# Patient Record
Sex: MALE | Race: WHITE | NOT HISPANIC OR LATINO | Employment: FULL TIME | URBAN - METROPOLITAN AREA
[De-identification: names, ages, dates, MRNs, and addresses within clinical notes are randomized per-mention and may not be internally consistent; named-entity substitution may affect disease eponyms.]

---

## 2017-01-17 ENCOUNTER — HOSPITAL ENCOUNTER (OUTPATIENT)
Dept: RADIOLOGY | Facility: MEDICAL CENTER | Age: 37
End: 2017-01-17
Attending: NURSE PRACTITIONER
Payer: COMMERCIAL

## 2017-01-17 ENCOUNTER — OFFICE VISIT (OUTPATIENT)
Dept: MEDICAL GROUP | Facility: MEDICAL CENTER | Age: 37
End: 2017-01-17
Payer: COMMERCIAL

## 2017-01-17 ENCOUNTER — TELEPHONE (OUTPATIENT)
Dept: MEDICAL GROUP | Facility: MEDICAL CENTER | Age: 37
End: 2017-01-17

## 2017-01-17 VITALS
HEART RATE: 66 BPM | BODY MASS INDEX: 33.32 KG/M2 | TEMPERATURE: 79.5 F | HEIGHT: 65 IN | SYSTOLIC BLOOD PRESSURE: 130 MMHG | WEIGHT: 200 LBS | DIASTOLIC BLOOD PRESSURE: 90 MMHG | OXYGEN SATURATION: 97 %

## 2017-01-17 DIAGNOSIS — E66.9 OBESITY (BMI 30-39.9): ICD-10-CM

## 2017-01-17 DIAGNOSIS — M54.6 ACUTE MIDLINE THORACIC BACK PAIN: ICD-10-CM

## 2017-01-17 PROCEDURE — 72070 X-RAY EXAM THORAC SPINE 2VWS: CPT

## 2017-01-17 PROCEDURE — 81002 URINALYSIS NONAUTO W/O SCOPE: CPT | Performed by: NURSE PRACTITIONER

## 2017-01-17 PROCEDURE — 99213 OFFICE O/P EST LOW 20 MIN: CPT | Performed by: NURSE PRACTITIONER

## 2017-01-17 RX ORDER — TIZANIDINE 4 MG/1
4 TABLET ORAL EVERY 6 HOURS PRN
Qty: 30 TAB | Refills: 3 | Status: SHIPPED | OUTPATIENT
Start: 2017-01-17 | End: 2019-01-30

## 2017-01-17 RX ORDER — TRAMADOL HYDROCHLORIDE 50 MG/1
50 TABLET ORAL EVERY 4 HOURS PRN
Qty: 20 TAB | Refills: 0 | Status: SHIPPED | OUTPATIENT
Start: 2017-01-17 | End: 2017-07-14

## 2017-01-17 RX ORDER — DICLOFENAC SODIUM 75 MG/1
75 TABLET, DELAYED RELEASE ORAL 2 TIMES DAILY
Qty: 30 TAB | Refills: 0 | Status: SHIPPED | OUTPATIENT
Start: 2017-01-17 | End: 2017-07-14

## 2017-01-17 NOTE — MR AVS SNAPSHOT
"        Timothy William Collette   2017 4:45 PM   Office Visit   MRN: [id number]    Department:  [facility]   Dept Phone:  [phone]    Description:  Male : 1980   Provider:  CHAI Solis           Allergies as of 2017     No Known Allergies      You were diagnosed with     Acute midline thoracic back pain   [[id number]]   UA in office neg for blood.  xray thoracic spine.  voltaren bid x 10 days.  tizanidine hs x 10 days.  no lifting or bending for next 2 weeks.      Obesity (BMI 30-39.9)   [362262]         Vital Signs     Blood Pressure Pulse Temperature Height Weight Body Mass Index    130/90 mmHg 66 26.4 °C (79.5 °F) 1.651 m (5' 5\") 90.719 kg (200 lb) 33.28 kg/m2    Oxygen Saturation Smoking Status                97% Former Smoker          Basic Information     Date Of Birth Sex Race Ethnicity Preferred Language    1980 Male White Non- English      Problem List              ICD-10-CM Priority Class Noted - Resolved    Chronic gout without tophus M1A.9XX0   Unknown - Present    Kidney stone N20.0   Unknown - Present    Obesity (BMI 30-39.9) E66.9   2017 - Present      Health Maintenance        Date Due Completion Dates    IMM DTaP/Tdap/Td Vaccine (1 - Tdap) 1997 ---    IMM INFLUENZA (1) 2016 ---            Current Immunizations     No immunizations on file.      Below and/or attached are the medications your provider expects you to take. Review all of your home medications and newly ordered medications with your provider and/or pharmacist. Follow medication instructions as directed by your provider and/or pharmacist. Please keep your medication list with you and share with your provider. Update the information when medications are discontinued, doses are changed, or new medications (including over-the-counter products) are added; and carry medication information at all times in the event of emergency situations     Allergies:  No Known Allergies          "   Medications  Valid as of: January 18, 2017 -  4:56 PM    Generic Name Brand Name Tablet Size Instructions for use    Diclofenac Sodium (Tablet Delayed Response) VOLTAREN 75 MG Take 1 Tab by mouth 2 times a day.        Indomethacin (Cap) INDOCIN 50 MG Take 1 Cap by mouth 3 times a day.        TiZANidine HCl (Tab) ZANAFLEX 4 MG Take 1 Tab by mouth every 6 hours as needed.        TraMADol HCl (Tab) ULTRAM 50 MG Take 1 Tab by mouth every four hours as needed.        .                 Medicines prescribed today were sent to:     [facility] [place] - [address]    [address] [place] [address]    Phone: [phone] Fax: [phone]    Open 24 Hours?: No      Medication refill instructions:       If your prescription bottle indicates you have medication refills left, it is not necessary to call your provider’s office. Please contact your pharmacy and they will refill your medication.    If your prescription bottle indicates you do not have any refills left, you may request refills at any time through one of the following ways: The online [employer] system (except Urgent Care), by calling your provider’s office, or by asking your pharmacy to contact your provider’s office with a refill request. Medication refills are processed only during regular business hours and may not be available until the next business day. Your provider may request additional information or to have a follow-up visit with you prior to refilling your medication.   *Please Note: Medication refills are assigned a new Rx number when refilled electronically. Your pharmacy may indicate that no refills were authorized even though a new prescription for the same medication is available at the pharmacy. Please request the medicine by name with the pharmacy before contacting your provider for a refill.        Your To Do List     Future Labs/Procedures Complete By Expires    DX-THORACIC SPINE-2 VIEWS  As directed 1/17/2018          MyChart Status: Patient Declined

## 2017-01-18 NOTE — PROGRESS NOTES
"Subjective:      Timothy William Collette is a 36 y.o. male who presents with No chief complaint on file.            HPI  Seen in f/u for thoracic back pain.  Started last nite.  No hxof injury.  Does have hx of kidney stone but pain is in mid back.  No blood in UA today in office.  Just pain in single spot.  Pain with deep breathing and reaching with arms.  He has been drinking adequately.  No chest pain or SOB.  No numbness or tingling.  Never had issue like this before.    Patient Active Problem List    Diagnosis Date Noted   • Obesity (BMI 30-39.9) 01/17/2017   • Kidney stone    • Chronic gout without tophus      Current Outpatient Prescriptions   Medication Sig Dispense Refill   • indomethacin (INDOCIN) 50 MG Cap Take 1 Cap by mouth 3 times a day. 30 Cap 0     No current facility-administered medications for this visit.     No Known Allergies    ROS    Review of Systems   Constitutional: Negative.  Negative for fever, chills, weight loss, malaise/fatigue and diaphoresis.   HENT: Negative.  Negative for hearing loss, ear pain, nosebleeds, congestion, sore throat, neck pain, tinnitus and ear discharge.    Respiratory: Negative.  Negative for cough, hemoptysis, sputum production, shortness of breath, wheezing and stridor.    Cardiovascular: Negative.  Negative for chest pain, palpitations, orthopnea, claudication, leg swelling and PND.   Gastrointestinal: denies nausea, vomiting, diarrhea, constipation, heartburn, melena or hematochezia.  Genitourinary: Denies dysuria, hematuria, urinary incontinence, frequency or urgency.         Objective:     /90 mmHg  Pulse 66  Temp(Src) 26.4 °C (79.5 °F)  Ht 1.651 m (5' 5\")  Wt 90.719 kg (200 lb)  BMI 33.28 kg/m2  SpO2 97%     Physical Exam  Physical Exam   Vitals reviewed.  Constitutional: oriented to person, place, and time. appears well-developed and well-nourished. No distress.   Cardiovascular: Normal rate, regular rhythm, normal heart sounds and intact " distal pulses.  Exam reveals no gallop and no friction rub.  No murmur heard.  No carotid bruits.   Pulmonary/Chest: Effort normal and breath sounds normal. No stridor. No respiratory distress. no wheezes or rales. exhibits no tenderness.   Musculoskeletal: Normal range of motion. exhibits no edema. kanwal pedal pulses 2+.  Pain to palp in mid thoracic spine area.  Abd:  No CVAT,  Soft,  Bs noted in all quadrants.  No HSM.  No abdominal tenderness.  Neurological: alert and oriented to person, place, and time. exhibits normal muscle tone. Coordination normal.   Skin: Skin is warm and dry. no diaphoresis.   Psychiatric: normal mood and affect. behavior is normal.                Assessment/Plan:     1. Acute midline thoracic back pain  diclofenac EC (VOLTAREN) 75 MG Tablet Delayed Response    tizanidine (ZANAFLEX) 4 MG Tab    tramadol (ULTRAM) 50 MG Tab    DX-THORACIC SPINE-2 VIEWS    UA in office neg for blood.  xray thoracic spine.  voltaren bid x 10 days.  tizanidine hs x 10 days.  no lifting or bending for next 2 weeks.     2. Obesity (BMI 30-39.9)  diclofenac EC (VOLTAREN) 75 MG Tablet Delayed Response    tizanidine (ZANAFLEX) 4 MG Tab    tramadol (ULTRAM) 50 MG Tab    DX-THORACIC SPINE-2 VIEWS     3.  Ultram 50 mg bid prn x 10 days back pain  4.  ER precautions for any worsening sx.  5.  Fu if sx not imrpoved with tx.

## 2017-01-18 NOTE — TELEPHONE ENCOUNTER
Please let pt know that the thoracic xray is wnl.  If sx not imrpoved with tx in next 10 days will proceed with MRI

## 2017-01-19 LAB
APPEARANCE UR: CLEAR
BILIRUB UR STRIP-MCNC: NORMAL MG/DL
COLOR UR AUTO: YELLOW
GLUCOSE UR STRIP.AUTO-MCNC: NORMAL MG/DL
KETONES UR STRIP.AUTO-MCNC: NORMAL MG/DL
LEUKOCYTE ESTERASE UR QL STRIP.AUTO: NORMAL
NITRITE UR QL STRIP.AUTO: NORMAL
PH UR STRIP.AUTO: 6 [PH] (ref 5–8)
PROT UR QL STRIP: NORMAL MG/DL
RBC UR QL AUTO: NORMAL
SP GR UR STRIP.AUTO: 1.01
UROBILINOGEN UR STRIP-MCNC: NORMAL MG/DL

## 2017-07-14 ENCOUNTER — OFFICE VISIT (OUTPATIENT)
Dept: MEDICAL GROUP | Facility: MEDICAL CENTER | Age: 37
End: 2017-07-14
Payer: COMMERCIAL

## 2017-07-14 VITALS
BODY MASS INDEX: 30.99 KG/M2 | OXYGEN SATURATION: 98 % | HEART RATE: 80 BPM | HEIGHT: 65 IN | SYSTOLIC BLOOD PRESSURE: 118 MMHG | TEMPERATURE: 98.4 F | WEIGHT: 186 LBS | DIASTOLIC BLOOD PRESSURE: 80 MMHG

## 2017-07-14 DIAGNOSIS — S16.1XXA ACUTE STRAIN OF NECK MUSCLE, INITIAL ENCOUNTER: ICD-10-CM

## 2017-07-14 DIAGNOSIS — E66.9 OBESITY (BMI 35.0-39.9 WITHOUT COMORBIDITY): ICD-10-CM

## 2017-07-14 PROCEDURE — 99214 OFFICE O/P EST MOD 30 MIN: CPT | Performed by: FAMILY MEDICINE

## 2017-07-14 RX ORDER — MELOXICAM 7.5 MG/1
7.5 TABLET ORAL DAILY
Qty: 30 TAB | Refills: 0 | Status: SHIPPED | OUTPATIENT
Start: 2017-07-14 | End: 2019-01-30

## 2017-07-14 NOTE — ASSESSMENT & PLAN NOTE
Three weeks ago was jet skiing and jumped a wave and lalo it.  No fall or other trauma.  He is taking 1/2 Zanaflex at night and that has helped. He has not been taking any anti-inflammatories or icing it. He has been doing some gentle stretches. He denies any previous neck pain. He denies any numbness or tingling. No loss of strength. No loss of bowel or bladder control.

## 2017-07-14 NOTE — MR AVS SNAPSHOT
"        Timothy William Collette   2017 11:20 AM   Office Visit   MRN: [id number]    Department:  [facility]   Dept Phone:  [phone]    Description:  Male : 1980   Provider:  Zohreh Travis M.D.           Reason for Visit     Neck Pain for 2 weeks       Allergies as of 2017     No Known Allergies      You were diagnosed with     Acute strain of neck muscle, initial encounter   [[id number]]         Vital Signs     Blood Pressure Pulse Temperature Height Weight Body Mass Index    118/80 mmHg 80 36.9 °C (98.4 °F) 1.651 m (5' 5\") 84.369 kg (186 lb) 30.95 kg/m2    Oxygen Saturation Smoking Status                98% Former Smoker          Basic Information     Date Of Birth Sex Race Ethnicity Preferred Language    1980 Male White Non- English      Problem List              ICD-10-CM Priority Class Noted - Resolved    Chronic gout without tophus M1A.9XX0   Unknown - Present    Kidney stone N20.0   Unknown - Present    Obesity (BMI 30-39.9) E66.9   2017 - Present    Acute strain of neck muscle S16.1XXA   2017 - Present      Health Maintenance        Date Due Completion Dates    IMM DTaP/Tdap/Td Vaccine (1 - Tdap) 1997 ---    IMM INFLUENZA (1) 2017 ---            Current Immunizations     No immunizations on file.      Below and/or attached are the medications your provider expects you to take. Review all of your home medications and newly ordered medications with your provider and/or pharmacist. Follow medication instructions as directed by your provider and/or pharmacist. Please keep your medication list with you and share with your provider. Update the information when medications are discontinued, doses are changed, or new medications (including over-the-counter products) are added; and carry medication information at all times in the event of emergency situations     Allergies:  No Known Allergies          Medications  Valid as of: 2017 - 11:35 AM   " Generic Name Brand Name Tablet Size Instructions for use    Meloxicam (Tab) MOBIC 7.5 MG Take 1 Tab by mouth every day.        TiZANidine HCl (Tab) ZANAFLEX 4 MG Take 1 Tab by mouth every 6 hours as needed.        .                 Medicines prescribed today were sent to:     [facility] [address] [facility], [place] [address]    [address] [place] [address]    Phone: [phone] Fax: [phone]    Open 24 Hours?: No      Medication refill instructions:       If your prescription bottle indicates you have medication refills left, it is not necessary to call your provider’s office. Please contact your pharmacy and they will refill your medication.    If your prescription bottle indicates you do not have any refills left, you may request refills at any time through one of the following ways: The online [employer] system (except Urgent Care), by calling your provider’s office, or by asking your pharmacy to contact your provider’s office with a refill request. Medication refills are processed only during regular business hours and may not be available until the next business day. Your provider may request additional information or to have a follow-up visit with you prior to refilling your medication.   *Please Note: Medication refills are assigned a new Rx number when refilled electronically. Your pharmacy may indicate that no refills were authorized even though a new prescription for the same medication is available at the pharmacy. Please request the medicine by name with the pharmacy before contacting your provider for a refill.        Instructions    Cervical Sprain  A cervical sprain is when the tissues (ligaments) that hold the neck bones in place stretch or tear.  HOME CARE   · Put ice on the injured area.  ¨ Put ice in a plastic bag.  ¨ Place a towel between your skin and the bag.  ¨ Leave the ice on for 15-20 minutes, 3-4 times a day.  · You may have been given a collar to wear. This collar keeps  your neck from moving while you heal.  ¨ Do not take the collar off unless told by your doctor.  ¨ If you have long hair, keep it outside of the collar.  ¨ Ask your doctor before changing the position of your collar. You may need to change its position over time to make it more comfortable.  ¨ If you are allowed to take off the collar for cleaning or bathing, follow your doctor's instructions on how to do it safely.  ¨ Keep your collar clean by wiping it with mild soap and water. Dry it completely. If the collar has removable pads, remove them every 1-2 days to hand wash them with soap and water. Allow them to air dry. They should be dry before you wear them in the collar.  ¨ Do not drive while wearing the collar.  · Only take medicine as told by your doctor.  · Keep all doctor visits as told.  · Keep all physical therapy visits as told.  · Adjust your work station so that you have good posture while you work.  · Avoid positions and activities that make your problems worse.  · Warm up and stretch before being active.  GET HELP IF:  · Your pain is not controlled with medicine.  · You cannot take less pain medicine over time as planned.  · Your activity level does not improve as expected.  GET HELP RIGHT AWAY IF:   · You are bleeding.  · Your stomach is upset.  · You have an allergic reaction to your medicine.  · You develop new problems that you cannot explain.  · You lose feeling (become numb) or you cannot move any part of your body (paralysis).  · You have tingling or weakness in any part of your body.  · Your symptoms get worse. Symptoms include:  ¨ Pain, soreness, stiffness, puffiness (swelling), or a burning feeling in your neck.  ¨ Pain when your neck is touched.  ¨ Shoulder or upper back pain.  ¨ Limited ability to move your neck.  ¨ Headache.  ¨ Dizziness.  ¨ Your hands or arms feel week, lose feeling, or tingle.  ¨ Muscle spasms.  ¨ Difficulty swallowing or chewing.  MAKE SURE YOU:   · Understand these  instructions.  · Will watch your condition.  · Will get help right away if you are not doing well or get worse.     This information is not intended to replace advice given to you by your health care provider. Make sure you discuss any questions you have with your health care provider.     Document Released: 06/05/2009 Document Revised: 08/20/2014 Document Reviewed: 06/25/2014  [employer] Interactive Patient Education ©2016 [employer]              MyChart Status: Patient Declined

## 2017-07-14 NOTE — PATIENT INSTRUCTIONS
Cervical Sprain  A cervical sprain is when the tissues (ligaments) that hold the neck bones in place stretch or tear.  HOME CARE   · Put ice on the injured area.  ¨ Put ice in a plastic bag.  ¨ Place a towel between your skin and the bag.  ¨ Leave the ice on for 15-20 minutes, 3-4 times a day.  · You may have been given a collar to wear. This collar keeps your neck from moving while you heal.  ¨ Do not take the collar off unless told by your doctor.  ¨ If you have long hair, keep it outside of the collar.  ¨ Ask your doctor before changing the position of your collar. You may need to change its position over time to make it more comfortable.  ¨ If you are allowed to take off the collar for cleaning or bathing, follow your doctor's instructions on how to do it safely.  ¨ Keep your collar clean by wiping it with mild soap and water. Dry it completely. If the collar has removable pads, remove them every 1-2 days to hand wash them with soap and water. Allow them to air dry. They should be dry before you wear them in the collar.  ¨ Do not drive while wearing the collar.  · Only take medicine as told by your doctor.  · Keep all doctor visits as told.  · Keep all physical therapy visits as told.  · Adjust your work station so that you have good posture while you work.  · Avoid positions and activities that make your problems worse.  · Warm up and stretch before being active.  GET HELP IF:  · Your pain is not controlled with medicine.  · You cannot take less pain medicine over time as planned.  · Your activity level does not improve as expected.  GET HELP RIGHT AWAY IF:   · You are bleeding.  · Your stomach is upset.  · You have an allergic reaction to your medicine.  · You develop new problems that you cannot explain.  · You lose feeling (become numb) or you cannot move any part of your body (paralysis).  · You have tingling or weakness in any part of your body.  · Your symptoms get worse. Symptoms include:  ¨ Pain,  soreness, stiffness, puffiness (swelling), or a burning feeling in your neck.  ¨ Pain when your neck is touched.  ¨ Shoulder or upper back pain.  ¨ Limited ability to move your neck.  ¨ Headache.  ¨ Dizziness.  ¨ Your hands or arms feel week, lose feeling, or tingle.  ¨ Muscle spasms.  ¨ Difficulty swallowing or chewing.  MAKE SURE YOU:   · Understand these instructions.  · Will watch your condition.  · Will get help right away if you are not doing well or get worse.     This information is not intended to replace advice given to you by your health care provider. Make sure you discuss any questions you have with your health care provider.     Document Released: 06/05/2009 Document Revised: 08/20/2014 Document Reviewed: 06/25/2014  [employer] Interactive Patient Education ©2016 [employer]

## 2017-07-14 NOTE — PROGRESS NOTES
"Subjective:     Chief Complaint   Patient presents with   • Neck Pain     for 2 weeks        Timothy William Collette is a 37 y.o. male here today for evaluation and management of:    Acute strain of neck muscle  Three weeks ago was jet skiing and jumped a wave and lalo it.  No fall or other trauma.  He is taking 1/2 Zanaflex at night and that has helped. He has not been taking any anti-inflammatories or icing it. He has been doing some gentle stretches. He denies any previous neck pain. He denies any numbness or tingling. No loss of strength. No loss of bowel or bladder control.       No Known Allergies    Current medicines (including changes today)  Current Outpatient Prescriptions   Medication Sig Dispense Refill   • meloxicam (MOBIC) 7.5 MG Tab Take 1 Tab by mouth every day. 30 Tab 0   • tizanidine (ZANAFLEX) 4 MG Tab Take 1 Tab by mouth every 6 hours as needed. 30 Tab 3     No current facility-administered medications for this visit.       He  has a past medical history of Chronic gout without tophus and Kidney stone.    Patient Active Problem List    Diagnosis Date Noted   • Acute strain of neck muscle 07/14/2017   • Obesity (BMI 35.0-39.9 without comorbidity) ([facility]) 07/14/2017   • Obesity (BMI 30-39.9) 01/17/2017   • Kidney stone    • Chronic gout without tophus        ROS   No fever or chills.  No nausea or vomiting.  No chest pain or palpitations.  No cough or SOB.  No pain with urination or hematuria.  No black or bloody stools.       Objective:     Blood pressure 118/80, pulse 80, temperature 36.9 °C (98.4 °F), height 1.651 m (5' 5\"), weight 84.369 kg (186 lb), SpO2 98 %. Body mass index is 30.95 kg/(m^2).   Physical Exam:  Well developed, well nourished.  Alert, oriented in no acute distress.  Eye contact is good, speech goal directed, affect calm  Eyes: conjunctiva non-injected, sclera non-icteric.  Neck Supple.  No adenopathy or masses in the neck or supraclavicular regions. No thyromegaly   No " visible deformity. No spinal tenderness to palpation but there is paraspinal muscle spasm and tenderness on the right into the trapezius. ROM intact. Spurling's test negative.   Lungs: clear to auscultation bilaterally with good excursion. No wheezes or rhonchi  CV: regular rate and rhythm. No murmur  Neurologic: 5/5 motor strength in the upper extremities with normal sensation      Assessment and Plan:   The following treatment plan was discussed    1. Acute strain of neck muscle, initial encounter  Discussed with patient that whiplash injuries take about 6 weeks to resolve. Given educational materials on stretching and rehabilitation. Start meloxicam as directed. Ice to the area. If not improving refer to physical therapy but patient would like to try home program first. He will follow up with Ashleigh June if not improving.  - meloxicam (MOBIC) 7.5 MG Tab; Take 1 Tab by mouth every day.  Dispense: 30 Tab; Refill: 0    2. Obesity (BMI 35.0-39.9 without comorbidity) ([facility])  Encouraged weight loss  - Patient identified as having weight management issue.  Appropriate orders and counseling given.    Any change or worsening of signs or symptoms, patient encouraged to follow-up or report to the emergency room for further evaluation. Patient understands and agrees.    Followup: Return if symptoms worsen or fail to improve.

## 2017-08-31 ENCOUNTER — APPOINTMENT (OUTPATIENT)
Dept: RADIOLOGY | Facility: IMAGING CENTER | Age: 37
End: 2017-08-31
Attending: NURSE PRACTITIONER
Payer: COMMERCIAL

## 2017-08-31 ENCOUNTER — OFFICE VISIT (OUTPATIENT)
Dept: URGENT CARE | Facility: CLINIC | Age: 37
End: 2017-08-31
Payer: COMMERCIAL

## 2017-08-31 VITALS
HEART RATE: 60 BPM | TEMPERATURE: 99.1 F | HEIGHT: 65 IN | DIASTOLIC BLOOD PRESSURE: 90 MMHG | WEIGHT: 180 LBS | SYSTOLIC BLOOD PRESSURE: 132 MMHG | BODY MASS INDEX: 29.99 KG/M2 | OXYGEN SATURATION: 98 % | RESPIRATION RATE: 16 BRPM

## 2017-08-31 DIAGNOSIS — S89.91XA KNEE INJURY, RIGHT, INITIAL ENCOUNTER: ICD-10-CM

## 2017-08-31 DIAGNOSIS — Z87.39 HISTORY OF GOUT: ICD-10-CM

## 2017-08-31 PROCEDURE — 99213 OFFICE O/P EST LOW 20 MIN: CPT | Performed by: NURSE PRACTITIONER

## 2017-08-31 PROCEDURE — 73562 X-RAY EXAM OF KNEE 3: CPT | Mod: TC,RT | Performed by: NURSE PRACTITIONER

## 2017-08-31 RX ORDER — HYDROCODONE BITARTRATE AND ACETAMINOPHEN 5; 325 MG/1; MG/1
1-2 TABLET ORAL EVERY 6 HOURS PRN
Qty: 20 TAB | Refills: 0 | Status: SHIPPED | OUTPATIENT
Start: 2017-08-31 | End: 2018-05-08

## 2017-08-31 RX ORDER — PREDNISONE 10 MG/1
TABLET ORAL
Qty: 15 TAB | Refills: 0 | Status: SHIPPED | OUTPATIENT
Start: 2017-08-31 | End: 2018-05-08

## 2017-08-31 NOTE — PROGRESS NOTES
"Subjective:      Timothy William Collette is a 37 y.o. male who presents with Knee Pain (right knee injured on water sport, since last Sunday, pain has increased, also have hx of gout, unsure if injury or flare up)    Past Medical History:   Diagnosis Date   • Chronic gout without tophus    • Kidney stone      Social History     Social History   • Marital status:      Spouse name: N/A   • Number of children: N/A   • Years of education: N/A     Occupational History   • Not on file.     Social History Main Topics   • Smoking status: Former Smoker     Packs/day: 0.50     Years: 1.00     Types: Cigarettes   • Smokeless tobacco: Never Used   • Alcohol use 0.0 oz/week      Comment: occ   • Drug use: No   • Sexual activity: Yes     Partners: Female     Other Topics Concern   • Not on file     Social History Narrative   • No narrative on file     Family History   Problem Relation Age of Onset   • Diabetes Mother    • Cancer Maternal Grandmother      melanoma       Allergies: Review of patient's allergies indicates no known allergies.    This patient is a 37-year-old male who presents today with complaint of pain and swelling to the right knee. Symptoms started 5 days ago when he was riding a jet ski and his knee against the side of the jet ski. Pain has exacerbated over the last 48 hours and patient is having increasingly painful range of motion with a decrease in overall range of motion. He denies any fever, aches, or chills.        Knee Pain   This is a new problem. The current episode started in the past 7 days. The problem occurs constantly. The problem has been unchanged. Nothing aggravates the symptoms. He has tried NSAIDs and rest for the symptoms. The treatment provided no relief.       Review of Systems   Musculoskeletal:        Knee pain over the last 7 days.   All other systems reviewed and are negative.         Objective:     /90   Pulse 60   Temp 37.3 °C (99.1 °F)   Resp 16   Ht 1.651 m (5' 5\") "   Wt 81.6 kg (180 lb)   SpO2 98%   BMI 29.95 kg/m²      Physical Exam   Constitutional: He is oriented to person, place, and time. He appears well-developed and well-nourished. No distress.   Musculoskeletal:        Right knee: He exhibits decreased range of motion and swelling. Tenderness found.        Legs:  There is generalized swelling over the knee. There is tenderness over the patella. Patient is able to extend fully but is able to flex only to 150°. There is slight warmth over the knee. No obvious erythema. Patient is afebrile.   Neurological: He is alert and oriented to person, place, and time.   Skin: Skin is warm and dry. Capillary refill takes less than 2 seconds. He is not diaphoretic.   Psychiatric: He has a normal mood and affect. His behavior is normal.   Vitals reviewed.    XR knee:       8/31/2017 9:33 AM    HISTORY/REASON FOR EXAM:  Pain/Deformity Following Trauma  Inferior right patellar pain post striking right knee on jet ski    TECHNIQUE/EXAM DESCRIPTION AND NUMBER OF VIEWS:  3 views of the RIGHT knee.    COMPARISON: None    FINDINGS:  No acute fracture or dislocation.    No joint arthropathy.    No knee joint effusion.   Impression         1. No acute osseous abnormality.   \    Discussed with patient that though his x-ray is negative, there is a possibility that he may have a septic arthritis with this. I did recommend labs including a CBC and uric acid, patient declined at this time. We will treat symptomatically with the understanding that the patient will follow-up in the emergency room for increasing pain, decreasing range of motion, fever, increased redness or erythema over the knee joint. Patient verbalized understanding and agreement with plan of care.            Assessment/Plan:   Knee injury  History of gout   -prednisone   -norco PRN pain   -REfused labs today (CBC, uric acid)   -Strict ER precautions for increasing pain, decreasing ROM, fever, redness.   There are no diagnoses  linked to this encounter.

## 2017-09-07 ENCOUNTER — NON-PROVIDER VISIT (OUTPATIENT)
Dept: URGENT CARE | Facility: CLINIC | Age: 37
End: 2017-09-07

## 2017-09-07 DIAGNOSIS — Z02.1 PRE-EMPLOYMENT DRUG SCREENING: ICD-10-CM

## 2017-09-07 LAB
AMP AMPHETAMINE: NORMAL
COC COCAINE: NORMAL
INT CON NEG: NORMAL
INT CON POS: NORMAL
MET METHAMPHETAMINES: NORMAL
OPI OPIATES: NORMAL
PCP PHENCYCLIDINE: NORMAL
POC DRUG COMMENT 753798-OCCUPATIONAL HEALTH: NORMAL
THC: NORMAL

## 2017-09-07 PROCEDURE — 80305 DRUG TEST PRSMV DIR OPT OBS: CPT | Performed by: NURSE PRACTITIONER

## 2018-05-08 ENCOUNTER — OFFICE VISIT (OUTPATIENT)
Dept: URGENT CARE | Facility: CLINIC | Age: 38
End: 2018-05-08
Payer: COMMERCIAL

## 2018-05-08 VITALS
WEIGHT: 189 LBS | BODY MASS INDEX: 31.49 KG/M2 | OXYGEN SATURATION: 96 % | RESPIRATION RATE: 14 BRPM | HEART RATE: 62 BPM | HEIGHT: 65 IN | SYSTOLIC BLOOD PRESSURE: 132 MMHG | DIASTOLIC BLOOD PRESSURE: 76 MMHG | TEMPERATURE: 98.2 F

## 2018-05-08 DIAGNOSIS — M10.9 ACUTE GOUT OF LEFT FOOT, UNSPECIFIED CAUSE: ICD-10-CM

## 2018-05-08 PROCEDURE — 99214 OFFICE O/P EST MOD 30 MIN: CPT | Performed by: NURSE PRACTITIONER

## 2018-05-08 RX ORDER — PREDNISONE 10 MG/1
40 TABLET ORAL DAILY
Qty: 20 TAB | Refills: 0 | Status: SHIPPED | OUTPATIENT
Start: 2018-05-08 | End: 2018-05-13

## 2018-05-08 RX ORDER — HYDROCODONE BITARTRATE AND ACETAMINOPHEN 5; 325 MG/1; MG/1
1-2 TABLET ORAL EVERY 4 HOURS PRN
Qty: 15 TAB | Refills: 0 | Status: SHIPPED | OUTPATIENT
Start: 2018-05-08 | End: 2018-05-11

## 2018-05-08 RX ORDER — INDOMETHACIN 50 MG/1
50 CAPSULE ORAL 3 TIMES DAILY
Qty: 90 CAP | Refills: 0 | Status: SHIPPED | OUTPATIENT
Start: 2018-05-08 | End: 2021-05-06

## 2018-05-08 ASSESSMENT — ENCOUNTER SYMPTOMS
NAUSEA: 0
SORE THROAT: 0
EYE PAIN: 0
MYALGIAS: 0
CHILLS: 0
WEAKNESS: 0
DIAPHORESIS: 0
NUMBNESS: 0
SHORTNESS OF BREATH: 0
DIZZINESS: 0
FEVER: 0
JOINT SWELLING: 1
VOMITING: 0

## 2018-05-08 ASSESSMENT — PATIENT HEALTH QUESTIONNAIRE - PHQ9: CLINICAL INTERPRETATION OF PHQ2 SCORE: 0

## 2018-05-08 NOTE — PATIENT INSTRUCTIONS
Gout  Introduction  Gout is painful swelling that can happen in some of your joints. Gout is a type of arthritis. This condition is caused by having too much uric acid in your body. Uric acid is a chemical that is made when your body breaks down substances called purines. If your body has too much uric acid, sharp crystals can form and build up in your joints. This causes pain and swelling.  Gout attacks can happen quickly and be very painful (acute gout). Over time, the attacks can affect more joints and happen more often (chronic gout).  Follow these instructions at home:  During a Gout Attack  · If directed, put ice on the painful area:  ¨ Put ice in a plastic bag.  ¨ Place a towel between your skin and the bag.  ¨ Leave the ice on for 20 minutes, 2-3 times a day.  · Rest the joint as much as possible. If the joint is in your leg, you may be given crutches to use.  · Raise (elevate) the painful joint above the level of your heart as often as you can.  · Drink enough fluids to keep your pee (urine) clear or pale yellow.  · Take over-the-counter and prescription medicines only as told by your doctor.  · Do not drive or use heavy machinery while taking prescription pain medicine.  · Follow instructions from your doctor about what you can or cannot eat and drink.  · Return to your normal activities as told by your doctor. Ask your doctor what activities are safe for you.  Avoiding Future Gout Attacks  · Follow a low-purine diet as told by a specialist (dietitian) or your doctor. Avoid foods and drinks that have a lot of purines, such as:  ¨ Liver.  ¨ Kidney.  ¨ Anchovies.  ¨ Asparagus.  ¨ Herring.  ¨ Mushrooms  ¨ Mussels.  ¨ Beer.  · Limit alcohol intake to no more than 1 drink a day for nonpregnant women and 2 drinks a day for men. One drink equals 12 oz of beer, 5 oz of wine, or 1½ oz of hard liquor.  · Stay at a healthy weight or lose weight if you are overweight. If you want to lose weight, talk with your doctor.  It is important that you do not lose weight too fast.  · Start or continue an exercise plan as told by your doctor.  · Drink enough fluids to keep your pee clear or pale yellow.  · Take over-the-counter and prescription medicines only as told by your doctor.  · Keep all follow-up visits as told by your doctor. This is important.  Contact a doctor if:  · You have another gout attack.  · You still have symptoms of a gout attack after10 days of treatment.  · You have problems (side effects) because of your medicines.  · You have chills or a fever.  · You have burning pain when you pee (urinate).  · You have pain in your lower back or belly.  Get help right away if:  · You have very bad pain.  · Your pain cannot be controlled.  · You cannot pee.  This information is not intended to replace advice given to you by your health care provider. Make sure you discuss any questions you have with your health care provider.  Document Released: 09/26/2009 Document Revised: 05/25/2017 Document Reviewed: 09/29/2016  © 2017 [employer]

## 2018-05-08 NOTE — PROGRESS NOTES
Subjective:     Timothy William Collette is a 38 y.o. male who presents for Gout (left foot)  Patient presents to clinic today with complaints of left foot pain. Patient has a history of gout and has been dealing with gout flare for the past week. Patient knows that sore a abated product triggers his gout and his sister made some cookies that had soy oil in them. He has been taking advil with minimal relief in symptoms.     Foot Problem   This is a new problem. The current episode started in the past 7 days. The problem occurs constantly. The problem has been unchanged. Associated symptoms include joint swelling. Pertinent negatives include no chest pain, chills, congestion, diaphoresis, fever, myalgias, nausea, numbness, rash, sore throat, vomiting or weakness. Associated symptoms comments: Left foot pain  . The symptoms are aggravated by walking. He has tried NSAIDs for the symptoms. The treatment provided no relief.     Past Medical History:   Diagnosis Date   • Chronic gout without tophus    • Kidney stone      Past Surgical History:   Procedure Laterality Date   • MYRINGOTOMY     • WRIST ARTHROSCOPY      tendon repair     Social History     Social History   • Marital status:      Spouse name: N/A   • Number of children: N/A   • Years of education: N/A     Occupational History   • Not on file.     Social History Main Topics   • Smoking status: Former Smoker     Packs/day: 0.50     Years: 1.00     Types: Cigarettes   • Smokeless tobacco: Never Used   • Alcohol use 0.0 oz/week      Comment: occ   • Drug use: No   • Sexual activity: Yes     Partners: Female     Other Topics Concern   • Not on file     Social History Narrative   • No narrative on file      Family History   Problem Relation Age of Onset   • Diabetes Mother    • Cancer Maternal Grandmother      melanoma    Review of Systems   Constitutional: Negative for chills, diaphoresis and fever.   HENT: Negative for congestion and sore throat.    Eyes:  "Negative for pain.   Respiratory: Negative for shortness of breath.    Cardiovascular: Negative for chest pain.   Gastrointestinal: Negative for nausea and vomiting.   Genitourinary: Negative for hematuria.   Musculoskeletal: Positive for joint pain and joint swelling. Negative for myalgias.        Left foot pain and swelling.    Skin: Negative for rash.   Neurological: Negative for dizziness, weakness and numbness.   No Known Allergies   Objective:   /76   Pulse 62   Temp 36.8 °C (98.2 °F)   Resp 14   Ht 1.651 m (5' 5\")   Wt 85.7 kg (189 lb)   SpO2 96%   BMI 31.45 kg/m²   Physical Exam   Constitutional: He is oriented to person, place, and time. He appears well-developed and well-nourished. No distress.   HENT:   Head: Normocephalic and atraumatic.   Right Ear: Tympanic membrane normal.   Left Ear: Tympanic membrane normal.   Nose: Nose normal. Right sinus exhibits no maxillary sinus tenderness and no frontal sinus tenderness. Left sinus exhibits no maxillary sinus tenderness and no frontal sinus tenderness.   Mouth/Throat: Uvula is midline, oropharynx is clear and moist and mucous membranes are normal. No posterior oropharyngeal edema, posterior oropharyngeal erythema or tonsillar abscesses. No tonsillar exudate.   Eyes: Conjunctivae and EOM are normal. Pupils are equal, round, and reactive to light. Right eye exhibits no discharge. Left eye exhibits no discharge.   Cardiovascular: Normal rate and regular rhythm.    No murmur heard.  Pulmonary/Chest: Effort normal and breath sounds normal. No respiratory distress.   Abdominal: Soft. He exhibits no distension. There is no tenderness.   Musculoskeletal:        Left foot: There is decreased range of motion, tenderness and swelling.        Feet:    TTP, erythematous and edematous    Neurological: He is alert and oriented to person, place, and time. He has normal reflexes. No sensory deficit.   Skin: Skin is warm, dry and intact.   Psychiatric: He has a " normal mood and affect.         Assessment/Plan:   Assessment    1. Acute gout of left foot, unspecified cause  - predniSONE (DELTASONE) 10 MG Tab; Take 4 Tabs by mouth every day for 5 days.  Dispense: 20 Tab; Refill: 0  - indomethacin (INDOCIN) 50 MG Cap; Take 1 Cap by mouth 3 times a day.  Dispense: 90 Cap; Refill: 0  - HYDROcodone-acetaminophen (NORCO) 5-325 MG Tab per tablet; Take 1-2 Tabs by mouth every four hours as needed for up to 3 days.  Dispense: 15 Tab; Refill: 0  - Consent for Opiate Prescription  [employer] Call query was performed to review the . The patient appears appropriate to receive medication as prescribed.        Differential diagnosis, natural history, supportive care, and indications for immediate follow-up discussed.

## 2018-11-26 ENCOUNTER — OFFICE VISIT (OUTPATIENT)
Dept: URGENT CARE | Facility: MEDICAL CENTER | Age: 38
End: 2018-11-26
Payer: COMMERCIAL

## 2018-11-26 VITALS
DIASTOLIC BLOOD PRESSURE: 74 MMHG | BODY MASS INDEX: 31.12 KG/M2 | OXYGEN SATURATION: 97 % | TEMPERATURE: 97.9 F | SYSTOLIC BLOOD PRESSURE: 128 MMHG | HEART RATE: 80 BPM | WEIGHT: 187 LBS

## 2018-11-26 DIAGNOSIS — H00.025 HORDEOLUM INTERNUM OF LEFT LOWER EYELID: ICD-10-CM

## 2018-11-26 PROCEDURE — 99214 OFFICE O/P EST MOD 30 MIN: CPT | Performed by: NURSE PRACTITIONER

## 2018-11-26 RX ORDER — CEPHALEXIN 500 MG/1
500 CAPSULE ORAL 4 TIMES DAILY
Qty: 28 CAP | Refills: 0 | Status: SHIPPED | OUTPATIENT
Start: 2018-11-26 | End: 2019-01-30 | Stop reason: SDUPTHER

## 2018-11-26 ASSESSMENT — ENCOUNTER SYMPTOMS
EYE DISCHARGE: 0
BLURRED VISION: 0
EYE ITCHING: 1
EYE REDNESS: 1

## 2018-11-26 NOTE — LETTER
November 26, 2018         Patient: Timothy William Collette   YOB: 1980   Date of Visit: 11/26/2018           To Whom it May Concern:    Timothy Collette was seen in my clinic on 11/26/2018. Please excuse him from work today.        Sincerely,           JULIUS Ace.  Electronically Signed

## 2018-11-26 NOTE — PROGRESS NOTES
Subjective:      Timothy William Collette is a 38 y.o. male who presents with Conjunctivitis (left eye, strained or possibly pink eye since yesterday,felt sore earlier in the week)            Eye Problem    The left eye is affected. This is a new problem. Episode onset: pt reports left lower eyelid redness and irritation x 1 week. he noticed a more pronounced bump to his eyelid yesterday. Denies any visual disturbance or fever. The problem occurs constantly. The problem has been gradually worsening. There was no injury mechanism. He does not wear contacts. Associated symptoms include eye redness and itching. Pertinent negatives include no blurred vision or eye discharge. Treatments tried: warm compress. The treatment provided no relief.       Review of Systems   Eyes: Positive for redness and itching. Negative for blurred vision and discharge.   All other systems reviewed and are negative.    Past Medical History:   Diagnosis Date   • Chronic gout without tophus    • Kidney stone       Past Surgical History:   Procedure Laterality Date   • MYRINGOTOMY     • WRIST ARTHROSCOPY      tendon repair      Social History     Social History   • Marital status:      Spouse name: N/A   • Number of children: N/A   • Years of education: N/A     Occupational History   • Not on file.     Social History Main Topics   • Smoking status: Former Smoker     Packs/day: 0.50     Years: 1.00     Types: Cigarettes   • Smokeless tobacco: Never Used   • Alcohol use 0.0 oz/week      Comment: occ   • Drug use: No   • Sexual activity: Yes     Partners: Female     Other Topics Concern   • Not on file     Social History Narrative   • No narrative on file          Objective:     /74 (BP Location: Left arm, Patient Position: Sitting, BP Cuff Size: Large adult)   Pulse 80   Temp 36.6 °C (97.9 °F) (Temporal)   Wt 84.8 kg (187 lb)   SpO2 97%   BMI 31.12 kg/m²      Physical Exam   Constitutional: He is oriented to person, place, and  time. Vital signs are normal. He appears well-developed and well-nourished.   HENT:   Head: Normocephalic and atraumatic.   Eyes: Pupils are equal, round, and reactive to light. Conjunctivae and EOM are normal. Left eye exhibits hordeolum. Right conjunctiva is not injected. Left conjunctiva is not injected.       Neck: Normal range of motion.   Cardiovascular: Normal rate and regular rhythm.    Pulmonary/Chest: Effort normal.   Musculoskeletal: Normal range of motion.   Neurological: He is alert and oriented to person, place, and time.   Skin: Skin is warm and dry. Capillary refill takes less than 2 seconds.   Psychiatric: He has a normal mood and affect. His speech is normal and behavior is normal. Thought content normal.   Vitals reviewed.              Assessment/Plan:     1. Hordeolum internum of left lower eyelid  - cephALEXin (KEFLEX) 500 MG Cap; Take 1 Cap by mouth 4 times a day for 7 days.  Dispense: 28 Cap; Refill: 0    Continue to apply warm compresses TID for one week  Provide area with gentle massage TID  Alternate tylenol and ibuprofen as needed for pain  Take full course of ABX  Work note provided  Supportive care, differential diagnoses, and indications for immediate follow-up discussed with patient.    Pathogenesis of diagnosis discussed including typical length and natural progression.      Instructed to return to  or nearest emergency department if symptoms fail to improve, for any change in condition, further concerns, or new concerning symptoms.  Patient states understanding of the plan of care and discharge instructions.

## 2019-01-30 ENCOUNTER — OFFICE VISIT (OUTPATIENT)
Dept: MEDICAL GROUP | Facility: MEDICAL CENTER | Age: 39
End: 2019-01-30
Payer: COMMERCIAL

## 2019-01-30 VITALS
DIASTOLIC BLOOD PRESSURE: 80 MMHG | TEMPERATURE: 97.6 F | BODY MASS INDEX: 32.15 KG/M2 | SYSTOLIC BLOOD PRESSURE: 122 MMHG | HEART RATE: 85 BPM | HEIGHT: 65 IN | OXYGEN SATURATION: 97 % | WEIGHT: 193 LBS

## 2019-01-30 DIAGNOSIS — H00.025 HORDEOLUM INTERNUM OF LEFT LOWER EYELID: ICD-10-CM

## 2019-01-30 PROCEDURE — 99214 OFFICE O/P EST MOD 30 MIN: CPT | Performed by: NURSE PRACTITIONER

## 2019-01-30 RX ORDER — ERYTHROMYCIN 5 MG/G
1 OINTMENT OPHTHALMIC 2 TIMES DAILY
Qty: 1 TUBE | Refills: 0 | Status: SHIPPED | OUTPATIENT
Start: 2019-01-30 | End: 2019-09-18

## 2019-01-30 RX ORDER — OFLOXACIN 3 MG/ML
1 SOLUTION/ DROPS OPHTHALMIC 4 TIMES DAILY
Qty: 1 BOTTLE | Refills: 0 | Status: SHIPPED | OUTPATIENT
Start: 2019-01-30 | End: 2019-09-18

## 2019-01-30 RX ORDER — CEPHALEXIN 500 MG/1
500 CAPSULE ORAL 4 TIMES DAILY
Qty: 28 CAP | Refills: 0 | Status: SHIPPED | OUTPATIENT
Start: 2019-01-30 | End: 2019-02-06

## 2019-01-30 ASSESSMENT — PATIENT HEALTH QUESTIONNAIRE - PHQ9: CLINICAL INTERPRETATION OF PHQ2 SCORE: 0

## 2019-01-30 NOTE — PROGRESS NOTES
"  Subjective:     Timothy William Collette is a 38 y.o. male who presents with hordeolum.    HPI:   Seen in f/u for rt eye hordeolum.  Sx started on sunday.  Rt eye lower lid very red and sweollen.  Previously had one on left in nov.  That had resolved.  Not reusing any products from first episode.  Eye started draining yellow dg.  Blurring vision d/t swelling and dg.  No fever, chills or sweating.  Periorbital area is sore.     Patient Active Problem List    Diagnosis Date Noted   • Acute strain of neck muscle 07/14/2017   • Obesity (BMI 35.0-39.9 without comorbidity) 07/14/2017   • Obesity (BMI 30-39.9) 01/17/2017   • Kidney stone    • Chronic gout without tophus        Current medicines (including changes today)  Current Outpatient Prescriptions   Medication Sig Dispense Refill   • cephALEXin (KEFLEX) 500 MG Cap Take 1 Cap by mouth 4 times a day for 7 days. 28 Cap 0   • erythromycin 5 MG/GM Ointment Place 1 Application in right eye 2 times a day. 1 Tube 0   • indomethacin (INDOCIN) 50 MG Cap Take 1 Cap by mouth 3 times a day. 90 Cap 0     No current facility-administered medications for this visit.        No Known Allergies    ROS  Constitutional: Negative. Negative for fever, chills, weight loss, malaise/fatigue and diaphoresis.   HENT: Negative. Negative for hearing loss, ear pain, nosebleeds, congestion, sore throat, neck pain, tinnitus and ear discharge.   Respiratory: Negative. Negative for cough, hemoptysis, sputum production, shortness of breath, wheezing and stridor.   Cardiovascular: Negative. Negative for chest pain, palpitations, orthopnea, claudication, leg swelling and PND.   Gastrointestinal: Denies nausea, vomiting, diarrhea, constipation, heartburn, melena or hematochezia.  Genitourinary: Denies dysuria, hematuria, urinary incontinence, frequency or urgency.        Objective:     Blood pressure 122/80, pulse 85, temperature 36.4 °C (97.6 °F), temperature source Temporal, height 1.651 m (5' 5\"), " weight 87.5 kg (193 lb), SpO2 97 %. Body mass index is 32.12 kg/m².    Physical Exam:  Vitals reviewed.  Constitutional: Oriented to person, place, and time. appears well-developed and well-nourished. No distress.  Rt lower lid and mildly on upper medial lid erythematous and mod edema.  Conjunctivae mildly erythematous.  Mild periorbital tenderness.  PERRLA.  No dg noted.    Cardiovascular: Normal rate, regular rhythm, normal heart sounds and intact distal pulses. Exam reveals no gallop and no friction rub. No murmur heard. No carotid bruits.   Pulmonary/Chest: Effort normal and breath sounds normal. No stridor. No respiratory distress. no wheezes or rales. exhibits no tenderness.   Musculoskeletal: Normal range of motion. exhibits no edema. kanwal pedal pulses 2+.  Lymphadenopathy: No cervical or supraclavicular adenopathy.   Neurological: Alert and oriented to person, place, and time. exhibits normal muscle tone.  Skin: Skin is warm and dry. No diaphoresis.   Psychiatric: Normal mood and affect. Behavior is normal.      Assessment and Plan:     The following treatment plan was discussed:    1. Hordeolum internum of left lower eyelid  cephALEXin (KEFLEX) 500 MG Cap    erythromycin 5 MG/GM Ointment    continue meds for 7 days.  f/u if sx not improved with tx.  recommended PE and lab - pt will try and do this summer.     Continue to apply warm compresses TID for one week  Provide area with gentle massage TID    Followup: Return if symptoms worsen or fail to improve.

## 2019-09-18 ENCOUNTER — OFFICE VISIT (OUTPATIENT)
Dept: MEDICAL GROUP | Facility: MEDICAL CENTER | Age: 39
End: 2019-09-18
Payer: COMMERCIAL

## 2019-09-18 VITALS
TEMPERATURE: 98.1 F | DIASTOLIC BLOOD PRESSURE: 94 MMHG | BODY MASS INDEX: 30.82 KG/M2 | HEART RATE: 88 BPM | OXYGEN SATURATION: 97 % | WEIGHT: 185 LBS | HEIGHT: 65 IN | SYSTOLIC BLOOD PRESSURE: 128 MMHG

## 2019-09-18 DIAGNOSIS — Z13.220 SCREENING FOR HYPERLIPIDEMIA: ICD-10-CM

## 2019-09-18 DIAGNOSIS — M10.061 ACUTE IDIOPATHIC GOUT OF RIGHT KNEE: ICD-10-CM

## 2019-09-18 DIAGNOSIS — Z13.89 SCREENING FOR MULTIPLE CONDITIONS: ICD-10-CM

## 2019-09-18 PROCEDURE — 99214 OFFICE O/P EST MOD 30 MIN: CPT | Performed by: NURSE PRACTITIONER

## 2019-09-18 RX ORDER — TRAMADOL HYDROCHLORIDE 50 MG/1
50 TABLET ORAL EVERY 4 HOURS PRN
Qty: 20 TAB | Refills: 0 | Status: SHIPPED | OUTPATIENT
Start: 2019-09-18 | End: 2019-10-18

## 2019-09-18 RX ORDER — COLCHICINE 0.6 MG/1
TABLET ORAL
Qty: 3 TAB | Refills: 2 | Status: SHIPPED | OUTPATIENT
Start: 2019-09-18 | End: 2020-07-13 | Stop reason: SDUPTHER

## 2019-09-18 NOTE — PROGRESS NOTES
Subjective:     Timothy William Collette is a 39 y.o. male who presents with gout flare.    HPI:   Seen in f/u for gout flare.  Sx started in rt knee on monday.  Now pain all the way up to hip.  He has indocin but its not working.  Has been o nalluprinol in past but it did not help and caused swelling.  Last uric acid in 2015 was 6.   Pain is now limiting his walking.  He is still having to work.    He is due updated preventative lab.      Patient Active Problem List    Diagnosis Date Noted   • Acute strain of neck muscle 07/14/2017   • Obesity (BMI 35.0-39.9 without comorbidity) 07/14/2017   • Obesity (BMI 30-39.9) 01/17/2017   • Kidney stone    • Chronic gout without tophus        Current medicines (including changes today)  Current Outpatient Medications   Medication Sig Dispense Refill   • colchicine (COLCRYS) 0.6 MG Tab Take 2 tabs this am and 1 tab 1 hr later. 3 Tab 2   • tramadol (ULTRAM) 50 MG Tab Take 1 Tab by mouth every four hours as needed for up to 30 days. 20 Tab 0   • indomethacin (INDOCIN) 50 MG Cap Take 1 Cap by mouth 3 times a day. 90 Cap 0     No current facility-administered medications for this visit.        No Known Allergies    ROS  Constitutional: Negative. Negative for fever, chills, weight loss, malaise/fatigue and diaphoresis.   HENT: Negative. Negative for hearing loss, ear pain, nosebleeds, congestion, sore throat, neck pain, tinnitus and ear discharge.   Respiratory: Negative. Negative for cough, hemoptysis, sputum production, shortness of breath, wheezing and stridor.   Cardiovascular: Negative. Negative for chest pain, palpitations, orthopnea, claudication, leg swelling and PND.   Gastrointestinal: Denies nausea, vomiting, diarrhea, constipation, heartburn, melena or hematochezia.  Genitourinary: Denies dysuria, hematuria, urinary incontinence, frequency or urgency.        Objective:     /94 (BP Location: Right arm, Patient Position: Sitting)   Pulse 88   Temp 36.7 °C (98.1  "°F) (Temporal)   Ht 1.651 m (5' 5\")   Wt 83.9 kg (185 lb)   SpO2 97%  Body mass index is 30.79 kg/m².    Physical Exam:  Vitals reviewed.  Constitutional: Oriented to person, place, and time. appears well-developed and well-nourished. No distress.   Cardiovascular: Normal rate, regular rhythm, normal heart sounds and intact distal pulses. Exam reveals no gallop and no friction rub. No murmur heard. No carotid bruits.   Pulmonary/Chest: Effort normal and breath sounds normal. No stridor. No respiratory distress. no wheezes or rales. exhibits no tenderness.   Musculoskeletal: pain with any range of motion rt knee. exhibits mod rt knee edema. kanwal pedal pulses 2+.  Lymphadenopathy: No cervical or supraclavicular adenopathy.   Neurological: Alert and oriented to person, place, and time. exhibits normal muscle tone.  Skin: Skin is warm and dry. No diaphoresis.   Psychiatric: Normal mood and affect. Behavior is normal.      Assessment and Plan:     The following treatment plan was discussed:    1. Acute idiopathic gout of right knee  colchicine (COLCRYS) 0.6 MG Tab    tramadol (ULTRAM) 50 MG Tab    URIC ACID    indocin now working.  use colchicine.  repeat if not better tomorrow.  do lab in next 3 mo and f/u for review   2. Screening for hyperlipidemia  Lipid Profile   3. Screening for multiple conditions  Comp Metabolic Panel         Followup: Return in about 3 months (around 12/18/2019).  "

## 2020-07-13 ENCOUNTER — OFFICE VISIT (OUTPATIENT)
Dept: URGENT CARE | Facility: CLINIC | Age: 40
End: 2020-07-13
Payer: COMMERCIAL

## 2020-07-13 VITALS
HEART RATE: 62 BPM | DIASTOLIC BLOOD PRESSURE: 86 MMHG | HEIGHT: 65 IN | SYSTOLIC BLOOD PRESSURE: 146 MMHG | RESPIRATION RATE: 20 BRPM | OXYGEN SATURATION: 96 % | TEMPERATURE: 98 F | BODY MASS INDEX: 29.16 KG/M2 | WEIGHT: 175 LBS

## 2020-07-13 DIAGNOSIS — M10.9 ACUTE GOUT, UNSPECIFIED CAUSE, UNSPECIFIED SITE: ICD-10-CM

## 2020-07-13 PROCEDURE — 99214 OFFICE O/P EST MOD 30 MIN: CPT | Performed by: PHYSICIAN ASSISTANT

## 2020-07-13 RX ORDER — PREDNISONE 20 MG/1
TABLET ORAL
Qty: 5 TAB | Refills: 0 | Status: SHIPPED | OUTPATIENT
Start: 2020-07-13 | End: 2020-08-03

## 2020-07-13 RX ORDER — INDOMETHACIN 50 MG/1
50 CAPSULE ORAL 3 TIMES DAILY
Qty: 42 CAP | Refills: 0 | Status: CANCELLED | OUTPATIENT
Start: 2020-07-13 | End: 2020-07-27

## 2020-07-13 RX ORDER — COLCHICINE 0.6 MG/1
TABLET ORAL
Qty: 3 TAB | Refills: 2 | Status: SHIPPED | OUTPATIENT
Start: 2020-07-13 | End: 2020-08-03

## 2020-07-13 ASSESSMENT — ENCOUNTER SYMPTOMS
FOCAL WEAKNESS: 0
SENSORY CHANGE: 0
CHILLS: 0
NAUSEA: 0
TINGLING: 0
VOMITING: 0
SHORTNESS OF BREATH: 0
FEVER: 0

## 2020-07-13 NOTE — PROGRESS NOTES
"Subjective:   Timothy William Collette  is a 40 y.o. male who presents for Gout ( right ankle, swelling and pain x 07/06/20)      HPI  She comes clinic out of concern for gout flare.  Notes nearly 1 week of gout flareup affecting the right ankle.  He denies cut scrapes or abrasions.  Denies fevers chills.  Denies trauma or injury.  Notes past medical history of recurrence of lower extremity (chronic) gout.  Patient notes he tried indomethacin at home with no relief.  He states symptoms have been present for the last 6 to 7 days.  He did contact his primary care but has not heard back thus far.  He requests colchicine as this is worked well in the past.  He mentions corticosteroids as well as narcotics also been helpful in the past with this.  He requests a work note for days missed of last week.  He is using a mobility scooter.      Review of Systems   Constitutional: Negative for chills and fever.   Respiratory: Negative for shortness of breath.    Gastrointestinal: Negative for nausea and vomiting.   Musculoskeletal: Positive for joint pain ( Right ankle pain).   Skin: Negative for rash.   Neurological: Negative for tingling, sensory change and focal weakness.       No Known Allergies     Objective:   /86 (BP Location: Right arm, Patient Position: Sitting, BP Cuff Size: Adult long)   Pulse 62   Temp 36.7 °C (98 °F) (Temporal)   Resp 20   Ht 1.651 m (5' 5\")   Wt 79.4 kg (175 lb)   SpO2 96%   BMI 29.12 kg/m²     Physical Exam  Vitals signs and nursing note reviewed.   Constitutional:       General: He is not in acute distress.     Appearance: He is well-developed. He is not diaphoretic.   HENT:      Head: Normocephalic and atraumatic.      Right Ear: External ear normal.      Left Ear: External ear normal.      Nose: Nose normal.   Eyes:      General: No scleral icterus.        Right eye: No discharge.         Left eye: No discharge.      Conjunctiva/sclera: Conjunctivae normal.   Neck:      " Musculoskeletal: Neck supple.   Pulmonary:      Effort: Pulmonary effort is normal. No respiratory distress.   Musculoskeletal: Normal range of motion.      Right ankle: Achilles tendon normal.      Comments: Diffuse edema and diffuse mild erythema, skin intact about right ankle and foot, no bony tenderness to palpation, pain with passive range of motion   Skin:     General: Skin is warm and dry.      Coloration: Skin is not pale.   Neurological:      Mental Status: He is alert and oriented to person, place, and time.      Coordination: Coordination normal.         Assessment/Plan:   1. Acute gout, unspecified cause, unspecified site  - predniSONE (DELTASONE) 20 MG Tab; Take one tab PO qd x 5d  Dispense: 5 Tab; Refill: 0  - colchicine (COLCRYS) 0.6 MG Tab; Take 2 tabs this am and 1 tab 1 hr later.  Dispense: 3 Tab; Refill: 2  Recommend conservative care, rest, ice, elevation, work on gentle ROM exercises, start with colchicine as this is worked in the past, with lack of success with and progressed to prednisone, make follow-up with primary care, discussed adherence to gout dietary restrictions  Return to clinic with lack of resolution or progression of symptoms.  ER precautions with any worsening symptoms are reviewed with patient/caregiver and they do express understanding  Cautioned regarding potential side effects of steroid, avoid nsaids while using    I have worn an N95 mask, gloves and eye protection for the entire encounter with this patient.     Differential diagnosis, natural history, supportive care, and indications for immediate follow-up discussed.

## 2020-07-13 NOTE — LETTER
July 13, 2020       Patient: Timothy William Collette   YOB: 1980   Date of Visit: 7/13/2020         To Whom It May Concern:    In my medical opinion, I recommend that Timothy Collette should be excused from work for last Thursday and Friday due to illness/injury. He is permitted back to work as scheduled.    If you have any questions or concerns, please don't hesitate to call [phone]          Sincerely,          Avel Pollock P.A.-C.  Electronically Signed

## 2020-07-14 DIAGNOSIS — M10.9 ACUTE GOUT OF LEFT FOOT, UNSPECIFIED CAUSE: ICD-10-CM

## 2020-07-14 RX ORDER — INDOMETHACIN 50 MG/1
50 CAPSULE ORAL 3 TIMES DAILY
Qty: 90 CAP | Refills: 0 | OUTPATIENT
Start: 2020-07-14

## 2020-08-03 ENCOUNTER — OFFICE VISIT (OUTPATIENT)
Dept: MEDICAL GROUP | Facility: MEDICAL CENTER | Age: 40
End: 2020-08-03
Payer: COMMERCIAL

## 2020-08-03 VITALS
SYSTOLIC BLOOD PRESSURE: 130 MMHG | HEART RATE: 71 BPM | BODY MASS INDEX: 30.16 KG/M2 | WEIGHT: 181 LBS | TEMPERATURE: 98.3 F | OXYGEN SATURATION: 97 % | DIASTOLIC BLOOD PRESSURE: 88 MMHG | HEIGHT: 65 IN

## 2020-08-03 DIAGNOSIS — R03.0 ELEVATED BLOOD PRESSURE READING: ICD-10-CM

## 2020-08-03 DIAGNOSIS — H00.012 HORDEOLUM EXTERNUM OF RIGHT LOWER EYELID: ICD-10-CM

## 2020-08-03 DIAGNOSIS — Z13.220 SCREENING FOR HYPERLIPIDEMIA: ICD-10-CM

## 2020-08-03 DIAGNOSIS — Z13.89 SCREENING FOR MULTIPLE CONDITIONS: ICD-10-CM

## 2020-08-03 PROCEDURE — 99214 OFFICE O/P EST MOD 30 MIN: CPT | Performed by: NURSE PRACTITIONER

## 2020-08-03 RX ORDER — ERYTHROMYCIN 5 MG/G
1 OINTMENT OPHTHALMIC
Qty: 3.5 G | Refills: 0 | Status: SHIPPED | OUTPATIENT
Start: 2020-08-03 | End: 2021-05-06

## 2020-08-03 ASSESSMENT — PATIENT HEALTH QUESTIONNAIRE - PHQ9: CLINICAL INTERPRETATION OF PHQ2 SCORE: 0

## 2020-08-03 NOTE — PROGRESS NOTES
Subjective:     Timothy William Collette is a 40 y.o. male who presents with rt lower lid sty.    HPI:   Seen in f/u for rt lower lid sty.  He has been doing warm moist soaks and tea bags.  Been there for 2 wks.  Finally just now sl better.  It is on rt lower mid lid.  Red and swollen still.  No change in vision.  He has a hx of them.  Usually very painful but not this time.  No eye pain/.  No fever, chills and sweating.    DBP again elevated for 3rd visit.  Not on meds.  Doesn't check bp.  Did drink more etoh yesterday.  Not on any meds for bp.      Patient Active Problem List    Diagnosis Date Noted   • Acute strain of neck muscle 07/14/2017   • Obesity (BMI 35.0-39.9 without comorbidity) 07/14/2017   • Obesity (BMI 30-39.9) 01/17/2017   • Kidney stone    • Chronic gout without tophus        Current medicines (including changes today)  Current Outpatient Medications   Medication Sig Dispense Refill   • erythromycin 5 MG/GM Ointment Place 1 Application in both eyes every bedtime. 3.5 g 0   • indomethacin (INDOCIN) 50 MG Cap Take 1 Cap by mouth 3 times a day. 90 Cap 0     No current facility-administered medications for this visit.        No Known Allergies    ROS  Constitutional: Negative. Negative for fever, chills, weight loss, malaise/fatigue and diaphoresis.   HENT: Negative. Negative for hearing loss, ear pain, nosebleeds, congestion, sore throat, neck pain, tinnitus and ear discharge.   Respiratory: Negative. Negative for cough, hemoptysis, sputum production, shortness of breath, wheezing and stridor.   Cardiovascular: Negative. Negative for chest pain, palpitations, orthopnea, claudication, leg swelling and PND.   Gastrointestinal: Denies nausea, vomiting, diarrhea, constipation, heartburn, melena or hematochezia.  Genitourinary: Denies dysuria, hematuria, urinary incontinence, frequency or urgency.        Objective:     /88 (BP Location: Right arm, Patient Position: Sitting)   Pulse 71   Temp 36.8  "°C (98.3 °F) (Temporal)   Ht 1.651 m (5' 5\")   Wt 82.1 kg (181 lb)   SpO2 97%  Body mass index is 30.12 kg/m².    Physical Exam:  Vitals reviewed.  Constitutional: Oriented to person, place, and time. appears well-developed and well-nourished. No distress.   Rt lower lid hordeolum 1 mm round raised wiht erythemata.  PERRLA.  No periorbital swelling or tenderness.    Cardiovascular: Normal rate, regular rhythm, normal heart sounds and intact distal pulses. Exam reveals no gallop and no friction rub. No murmur heard. No carotid bruits.   Pulmonary/Chest: Effort normal and breath sounds normal. No stridor. No respiratory distress. no wheezes or rales. exhibits no tenderness.   Musculoskeletal: Normal range of motion.   Lymphadenopathy: No cervical or supraclavicular adenopathy.   Neurological: Alert and oriented to person, place, and time. exhibits normal muscle tone.  Skin: Skin is warm and dry. No diaphoresis.   Psychiatric: Normal mood and affect. Behavior is normal.      Assessment and Plan:     The following treatment plan was discussed:    1. Hordeolum externum of right lower eyelid  erythromycin 5 MG/GM Ointment    persistent despite warm moist soaks.  continue soaks.  use EES oint.  email for ophth referral if not resolved with tx.     2. Elevated blood pressure reading  Comp Metabolic Panel    MICROALBUMIN CREAT RATIO URINE    check lab, including alb/cr ratio d/t elevated bp.  DO IN 9/2020.  f/u for review   3. Screening for hyperlipidemia  Lipid Profile   4. Screening for multiple conditions  Comp Metabolic Panel         Followup: Return in about 2 months (around 10/3/2020).  "

## 2021-05-06 ENCOUNTER — OFFICE VISIT (OUTPATIENT)
Dept: MEDICAL GROUP | Facility: MEDICAL CENTER | Age: 41
End: 2021-05-06
Payer: COMMERCIAL

## 2021-05-06 VITALS
DIASTOLIC BLOOD PRESSURE: 78 MMHG | BODY MASS INDEX: 32.46 KG/M2 | HEART RATE: 84 BPM | OXYGEN SATURATION: 96 % | TEMPERATURE: 98.6 F | WEIGHT: 194.8 LBS | HEIGHT: 65 IN | SYSTOLIC BLOOD PRESSURE: 124 MMHG

## 2021-05-06 DIAGNOSIS — E66.9 OBESITY (BMI 30-39.9): ICD-10-CM

## 2021-05-06 DIAGNOSIS — M1A.4720 CHRONIC GOUT DUE TO OTHER SECONDARY CAUSE INVOLVING TOE OF LEFT FOOT WITHOUT TOPHUS: ICD-10-CM

## 2021-05-06 PROCEDURE — 99214 OFFICE O/P EST MOD 30 MIN: CPT | Performed by: NURSE PRACTITIONER

## 2021-05-06 RX ORDER — COLCHICINE 0.6 MG/1
TABLET ORAL
Qty: 10 TABLET | Refills: 1 | Status: SHIPPED | OUTPATIENT
Start: 2021-05-06 | End: 2021-09-13 | Stop reason: SDUPTHER

## 2021-05-06 ASSESSMENT — PATIENT HEALTH QUESTIONNAIRE - PHQ9: CLINICAL INTERPRETATION OF PHQ2 SCORE: 0

## 2021-05-06 NOTE — PROGRESS NOTES
"Subjective:   Timothy William Collette is a 41 y.o. male here today for gout flare:    Chronic gout without tophus  Chronic. Most recent flare started about 3 months ago, was in right foot and has switched now to left foot about 2 weeks ago.     Has been taking Ibuprofen 400 mg BID for the past few months.     Was given Colchicine in July 2020 which worked well.     Was on allopurinol in the past which didn't seem to help.       Current medicines (including changes today)  Current Outpatient Medications   Medication Sig Dispense Refill   • colchicine (COLCRYS) 0.6 MG Tab Day 1: Take 2 tablets by mouth, then take 1 tablet 1 hour later. Day 2 and on: 1 tablet by mouth twice daily until pain resolves. 10 tablet 1     No current facility-administered medications for this visit.     He  has a past medical history of Chronic gout without tophus and Kidney stone.    ROS   No chest pain, no shortness of breath, no abdominal pain  Positive ROS as per HPI.  All other systems reviewed and are negative.     Objective:     /78 (BP Location: Right arm, Patient Position: Sitting)   Pulse 84   Temp 37 °C (98.6 °F) (Temporal)   Ht 1.651 m (5' 5\")   Wt 88.4 kg (194 lb 12.8 oz)   SpO2 96%  Body mass index is 32.42 kg/m².     Physical Exam:  Constitutional: Alert, no distress.  Skin: Warm, dry, good turgor, no rashes in visible areas.  Eye: Equal, round and reactive, conjunctiva clear, lids normal.  ENMT: Mask in place  Respiratory: Unlabored respiratory effort  Psych: Alert and oriented x3, normal affect and mood.  Foot: Left great toe with erythema and edema, tender to palpation. No open areas or drainage noted.       Assessment and Plan:   The following treatment plan was discussed    1. Chronic gout due to other secondary cause involving toe of left foot without tophus  Unstable  Dietary modifications discussed  Start colchicine daily, notify office if symptoms do not resolve.   Did not tolerate allopurinol, may consider " starting Uloric.   - colchicine (COLCRYS) 0.6 MG Tab; Day 1: Take 2 tablets by mouth, then take 1 tablet 1 hour later. Day 2 and on: 1 tablet by mouth twice daily until pain resolves.  Dispense: 10 tablet; Refill: 1      Followup: Return if symptoms worsen or fail to improve.    I have placed the below orders and discussed them with an approved delegating provider. The MA is performing the below orders under the direction of Dr. Espino

## 2021-05-06 NOTE — PATIENT INSTRUCTIONS
Low-Purine Eating Plan  A low-purine eating plan involves making food choices to limit your intake of purine. Purine is a kind of uric acid. Too much uric acid in your blood can cause certain conditions, such as gout and kidney stones. Eating a low-purine diet can help control these conditions.  What are tips for following this plan?  Reading food labels    · Avoid foods with saturated or Trans fat.  · Check the ingredient list of grains-based foods, such as bread and cereal, to make sure that they contain whole grains.  · Check the ingredient list of sauces or soups to make sure they do not contain meat or fish.  · When choosing soft drinks, check the ingredient list to make sure they do not contain high-fructose corn syrup.  Shopping  · Buy plenty of fresh fruits and vegetables.  · Avoid buying canned or fresh fish.  · Buy dairy products labeled as low-fat or nonfat.  · Avoid buying premade or processed foods. These foods are often high in fat, salt (sodium), and added sugar.  Cooking  · Use olive oil instead of butter when cooking. Oils like olive oil, canola oil, and sunflower oil contain healthy fats.  Meal planning  · Learn which foods do or do not affect you. If you find out that a food tends to cause your gout symptoms to flare up, avoid eating that food. You can enjoy foods that do not cause problems. If you have any questions about a food item, talk with your dietitian or health care provider.  · Limit foods high in fat, especially saturated fat. Fat makes it harder for your body to get rid of uric acid.  · Choose foods that are lower in fat and are lean sources of protein.  General guidelines  · Limit alcohol intake to no more than 1 drink a day for nonpregnant women and 2 drinks a day for men. One drink equals 12 oz of beer, 5 oz of wine, or 1½ oz of hard liquor. Alcohol can affect the way your body gets rid of uric acid.  · Drink plenty of water to keep your urine clear or pale yellow. Fluids can help  remove uric acid from your body.  · If directed by your health care provider, take a vitamin C supplement.  · Work with your health care provider and dietitian to develop a plan to achieve or maintain a healthy weight. Losing weight can help reduce uric acid in your blood.  What foods are recommended?  The items listed may not be a complete list. Talk with your dietitian about what dietary choices are best for you.  Foods low in purines  Foods low in purines do not need to be limited. These include:  · All fruits.  · All low-purine vegetables, pickles, and olives.  · Breads, pasta, rice, cornbread, and popcorn. Cake and other baked goods.  · All dairy foods.  · Eggs, nuts, and nut butters.  · Spices and condiments, such as salt, herbs, and vinegar.  · Plant oils, butter, and margarine.  · Water, sugar-free soft drinks, tea, coffee, and cocoa.  · Vegetable-based soups, broths, sauces, and gravies.  Foods moderate in purines  Foods moderate in purines should be limited to the amounts listed.  · ½ cup of asparagus, cauliflower, spinach, mushrooms, or green peas, each day.  · 2/3 cup uncooked oatmeal, each day.  · ¼ cup dry wheat bran or wheat germ, each day.  · 2-3 ounces of meat or poultry, each day.  · 4-6 ounces of shellfish, such as crab, lobster, oysters, or shrimp, each day.  · 1 cup cooked beans, peas, or lentils, each day.  · Soup, broths, or bouillon made from meat or fish. Limit these foods as much as possible.  What foods are not recommended?  The items listed may not be a complete list. Talk with your dietitian about what dietary choices are best for you.  Limit your intake of foods high in purines, including:  · Beer and other alcohol.  · Meat-based gravy or sauce.  · Canned or fresh fish, such as:  ? Anchovies, sardines, herring, and tuna.  ? Mussels and scallops.  ? Codfish, trout, and [place].  · Gracia.  · Organ meats, such as:  ? Liver or kidney.  ? Tripe.  ? Sweetbreads (thymus gland or  pancreas).  · Wild game or goose.  · Yeast or yeast extract supplements.  · Drinks sweetened with high-fructose corn syrup.  Summary  · Eating a low-purine diet can help control conditions caused by too much uric acid in the body, such as gout or kidney stones.  · Choose low-purine foods, limit alcohol, and limit foods high in fat.  · You will learn over time which foods do or do not affect you. If you find out that a food tends to cause your gout symptoms to flare up, avoid eating that food.  This information is not intended to replace advice given to you by your health care provider. Make sure you discuss any questions you have with your health care provider.  Document Released: 04/13/2012 Document Revised: 11/30/2018 Document Reviewed: 01/31/2018  Elsevier Patient Education © 2020 [employer]

## 2021-05-06 NOTE — ASSESSMENT & PLAN NOTE
Chronic. Most recent flare started about 3 months ago, was in right foot and has switched now to left foot about 2 weeks ago.     Has been taking Ibuprofen 400 mg BID for the past few months.     Was given Colchicine in July 2020 which worked well.     Was on allopurinol in the past which didn't seem to help.

## 2021-09-13 DIAGNOSIS — M1A.4720 CHRONIC GOUT DUE TO OTHER SECONDARY CAUSE INVOLVING TOE OF LEFT FOOT WITHOUT TOPHUS: ICD-10-CM

## 2021-09-13 RX ORDER — COLCHICINE 0.6 MG/1
TABLET ORAL
Qty: 10 TABLET | Refills: 1 | Status: SHIPPED | OUTPATIENT
Start: 2021-09-13 | End: 2022-03-08 | Stop reason: SDUPTHER

## 2021-09-17 ENCOUNTER — APPOINTMENT (OUTPATIENT)
Dept: MEDICAL GROUP | Facility: MEDICAL CENTER | Age: 41
End: 2021-09-17
Payer: COMMERCIAL

## 2022-02-02 ENCOUNTER — OFFICE VISIT (OUTPATIENT)
Dept: MEDICAL GROUP | Facility: MEDICAL CENTER | Age: 42
End: 2022-02-02
Payer: COMMERCIAL

## 2022-02-02 VITALS
HEART RATE: 75 BPM | BODY MASS INDEX: 33.32 KG/M2 | OXYGEN SATURATION: 97 % | TEMPERATURE: 98.3 F | WEIGHT: 200 LBS | SYSTOLIC BLOOD PRESSURE: 130 MMHG | DIASTOLIC BLOOD PRESSURE: 92 MMHG | HEIGHT: 65 IN

## 2022-02-02 DIAGNOSIS — J06.9 VIRAL URI WITH COUGH: ICD-10-CM

## 2022-02-02 DIAGNOSIS — J02.9 SORE THROAT: ICD-10-CM

## 2022-02-02 LAB
INT CON NEG: NORMAL
INT CON POS: NORMAL
S PYO AG THROAT QL: NEGATIVE

## 2022-02-02 PROCEDURE — 87880 STREP A ASSAY W/OPTIC: CPT | Performed by: INTERNAL MEDICINE

## 2022-02-02 PROCEDURE — 99213 OFFICE O/P EST LOW 20 MIN: CPT | Performed by: INTERNAL MEDICINE

## 2022-02-02 RX ORDER — KETOROLAC TROMETHAMINE 10 MG/1
10 TABLET, FILM COATED ORAL EVERY 6 HOURS PRN
Qty: 60 TABLET | Refills: 0 | Status: SHIPPED | OUTPATIENT
Start: 2022-02-02 | End: 2022-03-08

## 2022-02-02 RX ORDER — BENZONATATE 200 MG/1
200 CAPSULE ORAL 3 TIMES DAILY PRN
Qty: 60 CAPSULE | Refills: 1 | Status: SHIPPED | OUTPATIENT
Start: 2022-02-02 | End: 2022-03-08

## 2022-02-02 NOTE — PROGRESS NOTES
"    Established Patient    Demond presents today with the following:    CC:  Sore throat, cough    HPI:   Demond is a 42 y.o. male who came in for above.    He has been having URI symptoms for a week with sore throat, excessive cough with yellowish sputum, post nasal drip. No fever, chills, myalgia. He is tolerating oral liquid, unable to sleep well due to symptoms.  He takes ibuprofen 800 mg Q6-8 hr with minimal relief.    ROS:   As above    Patient Active Problem List    Diagnosis Date Noted   • Acute strain of neck muscle 07/14/2017   • Obesity (BMI 35.0-39.9 without comorbidity) 07/14/2017   • Obesity (BMI 30-39.9) 01/17/2017   • Kidney stone    • Chronic gout without tophus        Current Outpatient Medications   Medication Sig Dispense Refill   • ketorolac (TORADOL) 10 MG Tab Take 1 Tablet by mouth every 6 hours as needed. With food 60 Tablet 0   • benzonatate (TESSALON) 200 MG capsule Take 1 Capsule by mouth 3 times a day as needed for Cough. 60 Capsule 1   • colchicine (COLCRYS) 0.6 MG Tab Day 1: Take 2 tablets by mouth, then take 1 tablet 1 hour later. Day 2 and on: 1 tablet by mouth twice daily until pain resolves. 10 Tablet 1     No current facility-administered medications for this visit.         /92   Pulse 75   Temp 36.8 °C (98.3 °F) (Temporal)   Ht 1.651 m (5' 5\")   Wt 90.7 kg (200 lb)   SpO2 97%   BMI 33.28 kg/m²     Physical Exam  General: Alert and oriented, No apparent distress.  Throat: Clear no  exudates noted. Erythema +  Neck: Supple. subcentimeter cervical lymphadenopathy noted. Thyroid not enlarged.  Lungs: Clear to auscultation bilaterally without any wheezing, crepitations.  Cardiovascular: Regular rate and rhythm. No murmurs, rubs or gallops.       Assessment and Plan    1. Viral URI with cough  Rapid strep neg. For covid, he is beyond isolation period and will not be testing today.  - continue symptomatic treatment, salt water gurgle, control of post nasal drip with " flonase, zyrtec.   - ketorolac (TORADOL) 10 MG Tab; Take 1 Tablet by mouth every 6 hours as needed. With food  Dispense: 60 Tablet; Refill: 0  - benzonatate (TESSALON) 200 MG capsule; Take 1 Capsule by mouth 3 times a day as needed for Cough.  Dispense: 60 Capsule; Refill: 1    2. Sore throat  - ketorolac (TORADOL) 10 MG Tab; Take 1 Tablet by mouth every 6 hours as needed. With food  Dispense: 60 Tablet; Refill: 0        Return if symptoms worsen or fail to improve.         Signed by: Liliane Broussard M.D.

## 2022-02-25 DIAGNOSIS — M1A.4720 CHRONIC GOUT DUE TO OTHER SECONDARY CAUSE INVOLVING TOE OF LEFT FOOT WITHOUT TOPHUS: ICD-10-CM

## 2022-02-25 RX ORDER — COLCHICINE 0.6 MG/1
TABLET ORAL
Qty: 10 TABLET | Refills: 0 | OUTPATIENT
Start: 2022-02-25

## 2022-03-08 ENCOUNTER — OFFICE VISIT (OUTPATIENT)
Dept: MEDICAL GROUP | Facility: MEDICAL CENTER | Age: 42
End: 2022-03-08
Payer: COMMERCIAL

## 2022-03-08 VITALS
OXYGEN SATURATION: 96 % | SYSTOLIC BLOOD PRESSURE: 122 MMHG | TEMPERATURE: 98.2 F | DIASTOLIC BLOOD PRESSURE: 86 MMHG | HEIGHT: 65 IN | HEART RATE: 67 BPM | BODY MASS INDEX: 34.22 KG/M2 | WEIGHT: 205.4 LBS

## 2022-03-08 DIAGNOSIS — M1A.4720 CHRONIC GOUT DUE TO OTHER SECONDARY CAUSE INVOLVING TOE OF LEFT FOOT WITHOUT TOPHUS: ICD-10-CM

## 2022-03-08 DIAGNOSIS — Z13.89 SCREENING FOR MULTIPLE CONDITIONS: ICD-10-CM

## 2022-03-08 DIAGNOSIS — Z13.0 SCREENING, ANEMIA, DEFICIENCY, IRON: ICD-10-CM

## 2022-03-08 DIAGNOSIS — M10.371 ACUTE GOUT DUE TO RENAL IMPAIRMENT INVOLVING TOE OF RIGHT FOOT: ICD-10-CM

## 2022-03-08 DIAGNOSIS — R06.2 EXPIRATORY WHEEZING: ICD-10-CM

## 2022-03-08 DIAGNOSIS — Z13.21 ENCOUNTER FOR VITAMIN DEFICIENCY SCREENING: ICD-10-CM

## 2022-03-08 DIAGNOSIS — Z13.29 SCREENING FOR THYROID DISORDER: ICD-10-CM

## 2022-03-08 DIAGNOSIS — Z13.220 SCREENING FOR HYPERLIPIDEMIA: ICD-10-CM

## 2022-03-08 DIAGNOSIS — R03.0 ELEVATED BLOOD PRESSURE READING: ICD-10-CM

## 2022-03-08 PROCEDURE — 99214 OFFICE O/P EST MOD 30 MIN: CPT | Performed by: NURSE PRACTITIONER

## 2022-03-08 RX ORDER — ALBUTEROL SULFATE 90 UG/1
2 AEROSOL, METERED RESPIRATORY (INHALATION) EVERY 4 HOURS PRN
Qty: 1 EACH | Refills: 0 | Status: SHIPPED | OUTPATIENT
Start: 2022-03-08 | End: 2022-07-18 | Stop reason: SDUPTHER

## 2022-03-08 RX ORDER — COLCHICINE 0.6 MG/1
TABLET ORAL
Qty: 10 TABLET | Refills: 0 | Status: SHIPPED | OUTPATIENT
Start: 2022-03-08 | End: 2022-04-15

## 2022-03-09 NOTE — PROGRESS NOTES
Subjective:     Timothy William Collette is a 42 y.o. male who presents with gout in rt .    HPI:   Seen in f/u for gout in rt midfoot.  Sx started about 2 wks ago.  The pain is spreading to rest of the foot.  He has used indocin, prednison and colchicine.  Colchicine worked best.  Allopurinol didn't help and caused more sx.    He had COVID.  Since then he has had exp wheezing since having COVID.  It will wake him up at nite.  He is using his wifes inhaler.  No wheezing with his job.        Patient Active Problem List    Diagnosis Date Noted   • Obesity (BMI 30-39.9) 01/17/2017   • Kidney stone    • Chronic gout without tophus        Current medicines (including changes today)  Current Outpatient Medications   Medication Sig Dispense Refill   • colchicine (COLCRYS) 0.6 MG Tab Day 1: Take 2 tablets by mouth, then take 1 tablet 1 hour later. Day 2 and on: 1 tablet by mouth twice daily until pain resolves. 10 Tablet 0   • albuterol 108 (90 Base) MCG/ACT Aero Soln inhalation aerosol Inhale 2 Puffs every four hours as needed for Shortness of Breath. 1 Each 0     No current facility-administered medications for this visit.       No Known Allergies    ROS  Constitutional: Negative. Negative for fever, chills, weight loss, malaise/fatigue and diaphoresis.   HENT: Negative. Negative for hearing loss, ear pain, nosebleeds, congestion, sore throat, neck pain, tinnitus and ear discharge.   Respiratory: Negative. Negative for cough, hemoptysis, sputum production, shortness of breath, stridor.   Cardiovascular: Negative. Negative for chest pain, palpitations, orthopnea, claudication, leg swelling and PND.   Gastrointestinal: Denies nausea, vomiting, diarrhea, constipation, heartburn, melena or hematochezia.  Genitourinary: Denies dysuria, hematuria, urinary incontinence, frequency or urgency.        Objective:     /86 (BP Location: Right arm, Patient Position: Sitting)   Pulse 67   Temp 36.8 °C (98.2 °F) (Temporal)    "Ht 1.651 m (5' 5\")   Wt 93.2 kg (205 lb 6.4 oz)   SpO2 96%  Body mass index is 34.18 kg/m².    Physical Exam:  Vitals reviewed.    Physical Exam   Vitals reviewed.  Constitutional: oriented to person, place, and time. appears well-developed and well-nourished. No distress.   Cardiovascular: Normal rate, regular rhythm, normal heart sounds and intact distal pulses.  Exam reveals no gallop and no friction rub.  No murmur heard.  No carotid bruits.   Pulmonary/Chest: Effort normal and breath sounds normal. No stridor. No respiratory distress. no rales.  exhibits no tenderness.   Audible exp wheezing but lungs clear.    Musculoskeletal: Normal range of motion. exhibits rt foot edema edema. kanwal pedal pulses 2+.  Lymphadenopathy: no cervical or supraclavicular adenopathy.   Neurological: alert and oriented to person, place, and time. exhibits normal muscle tone. Coordination normal.   Skin: Skin is warm and dry. no diaphoresis.   Psychiatric: normal mood and affect. behavior is normal.        Assessment and Plan:     The following treatment plan was discussed:    1. Acute gout due to renal impairment involving toe of right foot  colchicine (COLCRYS) 0.6 MG Tab    use colchicine to clear.  do lab and f/u 1 mo for review.    2. Expiratory wheezing  albuterol 108 (90 Base) MCG/ACT Aero Soln inhalation aerosol    upper airway exp wheezing s/p COVID. use alb inhaler prn         Followup: Return in about 4 weeks (around 4/5/2022), or lab review.  "

## 2022-04-05 ENCOUNTER — APPOINTMENT (OUTPATIENT)
Dept: MEDICAL GROUP | Facility: MEDICAL CENTER | Age: 42
End: 2022-04-05
Payer: COMMERCIAL

## 2022-04-15 DIAGNOSIS — M10.371 ACUTE GOUT DUE TO RENAL IMPAIRMENT INVOLVING TOE OF RIGHT FOOT: ICD-10-CM

## 2022-04-15 RX ORDER — COLCHICINE 0.6 MG/1
TABLET ORAL
Qty: 10 TABLET | Refills: 0 | Status: SHIPPED | OUTPATIENT
Start: 2022-04-15 | End: 2022-06-14

## 2022-04-15 NOTE — TELEPHONE ENCOUNTER
Received request via: Pharmacy    Was the patient seen in the last year in this department? Yes    Does the patient have an active prescription (recently filled or refills available) for medication(s) requested? No   n/a

## 2022-06-12 DIAGNOSIS — M10.371 ACUTE GOUT DUE TO RENAL IMPAIRMENT INVOLVING TOE OF RIGHT FOOT: ICD-10-CM

## 2022-06-14 RX ORDER — COLCHICINE 0.6 MG/1
TABLET ORAL
Qty: 5 TABLET | Refills: 0 | Status: SHIPPED | OUTPATIENT
Start: 2022-06-14 | End: 2022-06-17

## 2022-06-17 ENCOUNTER — OFFICE VISIT (OUTPATIENT)
Dept: MEDICAL GROUP | Facility: MEDICAL CENTER | Age: 42
End: 2022-06-17
Payer: COMMERCIAL

## 2022-06-17 VITALS
WEIGHT: 200 LBS | TEMPERATURE: 97 F | OXYGEN SATURATION: 97 % | HEIGHT: 65 IN | HEART RATE: 74 BPM | DIASTOLIC BLOOD PRESSURE: 84 MMHG | SYSTOLIC BLOOD PRESSURE: 138 MMHG | BODY MASS INDEX: 33.32 KG/M2

## 2022-06-17 DIAGNOSIS — M10.371 ACUTE GOUT DUE TO RENAL IMPAIRMENT INVOLVING TOE OF RIGHT FOOT: ICD-10-CM

## 2022-06-17 DIAGNOSIS — M1A.4720 CHRONIC GOUT DUE TO OTHER SECONDARY CAUSE INVOLVING TOE OF LEFT FOOT WITHOUT TOPHUS: ICD-10-CM

## 2022-06-17 PROCEDURE — 99214 OFFICE O/P EST MOD 30 MIN: CPT | Performed by: STUDENT IN AN ORGANIZED HEALTH CARE EDUCATION/TRAINING PROGRAM

## 2022-06-17 RX ORDER — COLCHICINE 0.6 MG/1
TABLET ORAL
Qty: 5 TABLET | Refills: 0 | Status: SHIPPED | OUTPATIENT
Start: 2022-06-17 | End: 2022-07-18 | Stop reason: SDUPTHER

## 2022-06-17 RX ORDER — COLCHICINE 0.6 MG/1
0.6 TABLET ORAL DAILY
Qty: 15 TABLET | Refills: 0 | Status: SHIPPED | OUTPATIENT
Start: 2022-06-17 | End: 2022-07-18

## 2022-06-17 ASSESSMENT — PATIENT HEALTH QUESTIONNAIRE - PHQ9: CLINICAL INTERPRETATION OF PHQ2 SCORE: 0

## 2022-06-17 ASSESSMENT — ENCOUNTER SYMPTOMS
SHORTNESS OF BREATH: 0
FEVER: 0
CHILLS: 0

## 2022-06-17 NOTE — PROGRESS NOTES
"Subjective:     CC: Gout    HPI:   Demond presents today for the following;    Problem   Chronic Gout Without Tophus    Patient is here for gout flare of his second toe of the right foot. He has had multiple flares over the year  -soy seem to be the contributing factor that causes gout for him  -he had some colchicine and it was almost resolved but he ran out of colchicine   -he tried allopurinol in the past but this was not prescribed with allopurinol and he was having gout flares          Current Outpatient Medications Ordered in Epic   Medication Sig Dispense Refill   • colchicine (COLCRYS) 0.6 MG Tab Take 1 Tablet by mouth every day. 15 Tablet 0   • colchicine (COLCRYS) 0.6 MG Tab DAY 1: TAKE 2 TABLETS BY MOUTH, THEN 1 TABLET 1 HOUR LATER. DAY 2 AND ON: TAKE ONE TABLET TWICE DAILY UNTIL PAIN RESOLVES 5 Tablet 0   • albuterol 108 (90 Base) MCG/ACT Aero Soln inhalation aerosol Inhale 2 Puffs every four hours as needed for Shortness of Breath. 1 Each 0     No current [facility]-ordered facility-administered medications on file.           ROS:  Review of Systems   Constitutional: Negative for chills and fever.   Respiratory: Negative for shortness of breath.    Cardiovascular: Negative for chest pain.   Musculoskeletal: Positive for joint pain.       Objective:     Exam:  /84 (BP Location: Left arm, Patient Position: Sitting, BP Cuff Size: Adult)   Pulse 74   Temp 36.1 °C (97 °F) (Temporal)   Ht 1.651 m (5' 5\")   Wt 90.7 kg (200 lb)   SpO2 97%   BMI 33.28 kg/m²  Body mass index is 33.28 kg/m².    Physical Exam  Constitutional:       General: He is not in acute distress.     Appearance: He is not ill-appearing.   Pulmonary:      Effort: Pulmonary effort is normal.   Musculoskeletal:         General: Swelling (swelling of second big toe on left root, red and tender) present.   Neurological:      Mental Status: He is alert.   Psychiatric:         Mood and Affect: Mood normal.         Behavior: Behavior normal.   "       Thought Content: Thought content normal.         Judgment: Judgment normal.               Assessment & Plan:     Problem List Items Addressed This Visit     Chronic gout without tophus     Chronic-in execration  -patient is having an acute gout flare  -colchicine prescribed  -uric acid levels ordered  -in my opinion patient should be started gout ppx with allopurinol along with colchicine for 3-6 months. Patient is currently acute gout flare therefore allopurinol can not be prescribed at this time                 Relevant Medications    colchicine (COLCRYS) 0.6 MG Tab    Other Relevant Orders    URIC ACID              No follow-ups on file.    Please note that this dictation was created using voice recognition software. I have made every reasonable attempt to correct obvious errors, but I expect that there are errors of grammar and possibly content that I did not discover before finalizing the note.

## 2022-06-17 NOTE — ASSESSMENT & PLAN NOTE
Chronic-in execration  -patient is having an acute gout flare  -colchicine prescribed  -uric acid levels ordered  -in my opinion patient should be started gout ppx with allopurinol along with colchicine for 3-6 months. Patient is currently acute gout flare therefore allopurinol can not be prescribed at this time

## 2022-06-29 ENCOUNTER — HOSPITAL ENCOUNTER (OUTPATIENT)
Dept: LAB | Facility: MEDICAL CENTER | Age: 42
End: 2022-06-29
Attending: NURSE PRACTITIONER
Payer: COMMERCIAL

## 2022-06-29 DIAGNOSIS — Z13.0 SCREENING, ANEMIA, DEFICIENCY, IRON: ICD-10-CM

## 2022-06-29 DIAGNOSIS — R03.0 ELEVATED BLOOD PRESSURE READING: ICD-10-CM

## 2022-06-29 DIAGNOSIS — Z13.29 SCREENING FOR THYROID DISORDER: ICD-10-CM

## 2022-06-29 DIAGNOSIS — Z13.89 SCREENING FOR MULTIPLE CONDITIONS: ICD-10-CM

## 2022-06-29 DIAGNOSIS — M1A.4720 CHRONIC GOUT DUE TO OTHER SECONDARY CAUSE INVOLVING TOE OF LEFT FOOT WITHOUT TOPHUS: ICD-10-CM

## 2022-06-29 DIAGNOSIS — Z13.220 SCREENING FOR HYPERLIPIDEMIA: ICD-10-CM

## 2022-06-29 DIAGNOSIS — Z13.21 ENCOUNTER FOR VITAMIN DEFICIENCY SCREENING: ICD-10-CM

## 2022-06-29 LAB
25(OH)D3 SERPL-MCNC: 39 NG/ML (ref 30–100)
ALBUMIN SERPL BCP-MCNC: 5 G/DL (ref 3.2–4.9)
ALBUMIN/GLOB SERPL: 1.8 G/DL
ALP SERPL-CCNC: 97 U/L (ref 30–99)
ALT SERPL-CCNC: 132 U/L (ref 2–50)
ANION GAP SERPL CALC-SCNC: 14 MMOL/L (ref 7–16)
AST SERPL-CCNC: 58 U/L (ref 12–45)
BASOPHILS # BLD AUTO: 1.7 % (ref 0–1.8)
BASOPHILS # BLD: 0.11 K/UL (ref 0–0.12)
BILIRUB SERPL-MCNC: 0.4 MG/DL (ref 0.1–1.5)
BUN SERPL-MCNC: 9 MG/DL (ref 8–22)
CALCIUM SERPL-MCNC: 9.2 MG/DL (ref 8.5–10.5)
CHLORIDE SERPL-SCNC: 103 MMOL/L (ref 96–112)
CHOLEST SERPL-MCNC: 262 MG/DL (ref 100–199)
CO2 SERPL-SCNC: 24 MMOL/L (ref 20–33)
CREAT SERPL-MCNC: 0.9 MG/DL (ref 0.5–1.4)
CREAT UR-MCNC: 94.43 MG/DL
EOSINOPHIL # BLD AUTO: 0.11 K/UL (ref 0–0.51)
EOSINOPHIL NFR BLD: 1.7 % (ref 0–6.9)
ERYTHROCYTE [DISTWIDTH] IN BLOOD BY AUTOMATED COUNT: 43.5 FL (ref 35.9–50)
FASTING STATUS PATIENT QL REPORTED: NORMAL
GFR SERPLBLD CREATININE-BSD FMLA CKD-EPI: 108 ML/MIN/1.73 M 2
GLOBULIN SER CALC-MCNC: 2.8 G/DL (ref 1.9–3.5)
GLUCOSE SERPL-MCNC: 96 MG/DL (ref 65–99)
HCT VFR BLD AUTO: 49.2 % (ref 42–52)
HDLC SERPL-MCNC: 78 MG/DL
HGB BLD-MCNC: 17.6 G/DL (ref 14–18)
IMM GRANULOCYTES # BLD AUTO: 0.03 K/UL (ref 0–0.11)
IMM GRANULOCYTES NFR BLD AUTO: 0.5 % (ref 0–0.9)
LDLC SERPL CALC-MCNC: 149 MG/DL
LYMPHOCYTES # BLD AUTO: 1.94 K/UL (ref 1–4.8)
LYMPHOCYTES NFR BLD: 30.8 % (ref 22–41)
MCH RBC QN AUTO: 33.9 PG (ref 27–33)
MCHC RBC AUTO-ENTMCNC: 35.8 G/DL (ref 33.7–35.3)
MCV RBC AUTO: 94.8 FL (ref 81.4–97.8)
MICROALBUMIN UR-MCNC: 1.3 MG/DL
MICROALBUMIN/CREAT UR: 14 MG/G (ref 0–30)
MONOCYTES # BLD AUTO: 0.64 K/UL (ref 0–0.85)
MONOCYTES NFR BLD AUTO: 10.2 % (ref 0–13.4)
NEUTROPHILS # BLD AUTO: 3.47 K/UL (ref 1.82–7.42)
NEUTROPHILS NFR BLD: 55.1 % (ref 44–72)
NRBC # BLD AUTO: 0 K/UL
NRBC BLD-RTO: 0 /100 WBC
PLATELET # BLD AUTO: 249 K/UL (ref 164–446)
PMV BLD AUTO: 11.3 FL (ref 9–12.9)
POTASSIUM SERPL-SCNC: 4.3 MMOL/L (ref 3.6–5.5)
PROT SERPL-MCNC: 7.8 G/DL (ref 6–8.2)
RBC # BLD AUTO: 5.19 M/UL (ref 4.7–6.1)
SODIUM SERPL-SCNC: 141 MMOL/L (ref 135–145)
TRIGL SERPL-MCNC: 173 MG/DL (ref 0–149)
TSH SERPL DL<=0.005 MIU/L-ACNC: 2.43 UIU/ML (ref 0.38–5.33)
URATE SERPL-MCNC: 11.5 MG/DL (ref 2.5–8.3)
WBC # BLD AUTO: 6.3 K/UL (ref 4.8–10.8)

## 2022-06-29 PROCEDURE — 82306 VITAMIN D 25 HYDROXY: CPT

## 2022-06-29 PROCEDURE — 84443 ASSAY THYROID STIM HORMONE: CPT

## 2022-06-29 PROCEDURE — 85025 COMPLETE CBC W/AUTO DIFF WBC: CPT

## 2022-06-29 PROCEDURE — 82570 ASSAY OF URINE CREATININE: CPT

## 2022-06-29 PROCEDURE — 36415 COLL VENOUS BLD VENIPUNCTURE: CPT

## 2022-06-29 PROCEDURE — 80053 COMPREHEN METABOLIC PANEL: CPT

## 2022-06-29 PROCEDURE — 84550 ASSAY OF BLOOD/URIC ACID: CPT

## 2022-06-29 PROCEDURE — 80061 LIPID PANEL: CPT

## 2022-06-29 PROCEDURE — 82043 UR ALBUMIN QUANTITATIVE: CPT

## 2022-07-18 ENCOUNTER — HOSPITAL ENCOUNTER (OUTPATIENT)
Dept: RADIOLOGY | Facility: MEDICAL CENTER | Age: 42
End: 2022-07-18
Attending: NURSE PRACTITIONER
Payer: COMMERCIAL

## 2022-07-18 ENCOUNTER — TELEPHONE (OUTPATIENT)
Dept: MEDICAL GROUP | Facility: MEDICAL CENTER | Age: 42
End: 2022-07-18

## 2022-07-18 ENCOUNTER — OFFICE VISIT (OUTPATIENT)
Dept: MEDICAL GROUP | Facility: MEDICAL CENTER | Age: 42
End: 2022-07-18
Payer: COMMERCIAL

## 2022-07-18 VITALS
DIASTOLIC BLOOD PRESSURE: 82 MMHG | RESPIRATION RATE: 14 BRPM | OXYGEN SATURATION: 100 % | BODY MASS INDEX: 31.66 KG/M2 | WEIGHT: 197 LBS | HEART RATE: 74 BPM | HEIGHT: 66 IN | SYSTOLIC BLOOD PRESSURE: 120 MMHG | TEMPERATURE: 98.3 F

## 2022-07-18 DIAGNOSIS — E78.5 HYPERLIPIDEMIA LDL GOAL <100: ICD-10-CM

## 2022-07-18 DIAGNOSIS — R06.2 EXPIRATORY WHEEZING: ICD-10-CM

## 2022-07-18 DIAGNOSIS — R80.9 PROTEINURIA, UNSPECIFIED TYPE: ICD-10-CM

## 2022-07-18 DIAGNOSIS — E55.9 VITAMIN D DEFICIENCY: ICD-10-CM

## 2022-07-18 DIAGNOSIS — M1A.9XX0 CHRONIC GOUT WITHOUT TOPHUS, UNSPECIFIED CAUSE, UNSPECIFIED SITE: ICD-10-CM

## 2022-07-18 DIAGNOSIS — R79.89 ELEVATED LFTS: ICD-10-CM

## 2022-07-18 PROCEDURE — 99214 OFFICE O/P EST MOD 30 MIN: CPT | Performed by: NURSE PRACTITIONER

## 2022-07-18 PROCEDURE — 71046 X-RAY EXAM CHEST 2 VIEWS: CPT

## 2022-07-18 RX ORDER — ALBUTEROL SULFATE 90 UG/1
2 AEROSOL, METERED RESPIRATORY (INHALATION) EVERY 4 HOURS PRN
Qty: 1 EACH | Refills: 0 | Status: SHIPPED | OUTPATIENT
Start: 2022-07-18 | End: 2023-03-22 | Stop reason: SDUPTHER

## 2022-07-18 RX ORDER — FLUTICASONE PROPIONATE AND SALMETEROL 250; 50 UG/1; UG/1
1 POWDER RESPIRATORY (INHALATION) EVERY 12 HOURS
Qty: 180 EACH | Refills: 0 | Status: SHIPPED | OUTPATIENT
Start: 2022-07-18 | End: 2023-03-22 | Stop reason: SDUPTHER

## 2022-07-18 RX ORDER — ALLOPURINOL 100 MG/1
100 TABLET ORAL DAILY
Qty: 30 TABLET | Refills: 3 | Status: SHIPPED | OUTPATIENT
Start: 2022-07-18 | End: 2023-02-25

## 2022-07-18 RX ORDER — COLCHICINE 0.6 MG/1
TABLET ORAL
Qty: 5 TABLET | Refills: 0 | Status: SHIPPED | OUTPATIENT
Start: 2022-07-18 | End: 2022-08-17

## 2022-07-18 ASSESSMENT — FIBROSIS 4 INDEX: FIB4 SCORE: 0.89

## 2022-07-18 NOTE — PROGRESS NOTES
Subjective:     Timothy William Collette is a 42 y.o. male who presents with gout.    HPI:   Seen in f/u for gout.  He continues to have gout flare ups. About 3 flares in last 6 mo.  Has been improving diet but the flares were just from mistakes in eating diet.  Need colchicine refill.  He has been on allopurinol but it made his gout worse.   He had COVID in feb.  Still has some congestions.  It is only in upper airway. Secretions are clear.  Uses alb inhaler randomly.  He uses for wheezing with constriction in throat.    [place] lab with pt.  GFR, CBC, TSH IS WNL  CMP shows elevated lft.  Had been drinking more etoh just before doing lab.  LP shows trg up from 111 to 173.  HDL at goal of 78.  LDL is high at 148.  Not on med for LP.  Not on healthy diet.  Walking some for exercise.   Alb/cr ratio up from 0 to 14.  Bp controlled w/o meds.   Uric acid is high at 11.5.    Vitamin d is low at 22.  Not on otc supplement.    Patient Active Problem List    Diagnosis Date Noted   • Obesity (BMI 30-39.9) 01/17/2017   • Kidney stone    • Chronic gout without tophus        Current medicines (including changes today)  Current Outpatient Medications   Medication Sig Dispense Refill   • colchicine (COLCRYS) 0.6 MG Tab TAKE 2 TABLETS BY MOUTH ON DAY 1, THEN 1 TABLET 1 HOUR LATER. THEN TAKE 1 TABLET TWICE DAILY ON DAY 2 AND CONTINUE UNTIL PAIN RESOLVES 5 Tablet 0   • albuterol 108 (90 Base) MCG/ACT Aero Soln inhalation aerosol Inhale 2 Puffs every four hours as needed for Shortness of Breath. 1 Each 0   • allopurinol (ZYLOPRIM) 100 MG Tab Take 1 Tablet by mouth every day. 30 Tablet 3   • fluticasone-salmeterol (ADVAIR) 250-50 MCG/ACT AEROSOL POWDER, BREATH ACTIVATED Inhale 1 Puff every 12 hours. 180 Each 0     No current facility-administered medications for this visit.       No Known Allergies    ROS  Constitutional: Negative. Negative for fever, chills, weight loss, malaise/fatigue and diaphoresis.   HENT: Negative. Negative  "for hearing loss, ear pain, nosebleeds, congestion, sore throat, neck pain, tinnitus and ear discharge.   Respiratory: Negative. Negative for cough, hemoptysis, sputum production, shortness of breath, wheezing and stridor.   Cardiovascular: Negative. Negative for chest pain, palpitations, orthopnea, claudication, leg swelling and PND.   Gastrointestinal: Denies nausea, vomiting, diarrhea, constipation, heartburn, melena or hematochezia.  Genitourinary: Denies dysuria, hematuria, urinary incontinence, frequency or urgency.        Objective:     /82   Pulse 74   Temp 36.8 °C (98.3 °F) (Temporal)   Resp 14   Ht 1.676 m (5' 6\")   Wt 89.4 kg (197 lb)   SpO2 100%  Body mass index is 31.8 kg/m².    Physical Exam:  Vitals reviewed.  Constitutional: Oriented to person, place, and time. appears well-developed and well-nourished. No distress.   Cardiovascular: Normal rate, regular rhythm, normal heart sounds and intact distal pulses. Exam reveals no gallop and no friction rub. No murmur heard. No carotid bruits.   Pulmonary/Chest: Effort normal.  No stridor. No respiratory distress. no rales.  Exp wheezing TA and RLL.  Ins and exp wheezing RUL.  exhibits no tenderness.   Musculoskeletal: Normal range of motion. exhibits no edema. kanwal pedal pulses 2+.  Lymphadenopathy: No cervical or supraclavicular adenopathy.   Neurological: Alert and oriented to person, place, and time. exhibits normal muscle tone.  Skin: Skin is warm and dry. No diaphoresis.   Psychiatric: Normal mood and affect. Behavior is normal.      Assessment and Plan:     The following treatment plan was discussed:    1. Chronic gout without tophus, unspecified cause, unspecified site  colchicine (COLCRYS) 0.6 MG Tab    allopurinol (ZYLOPRIM) 100 MG Tab    URIC ACID    start allopurinol w/next gout flare along w/colchicine.  RF colchicine & rescue inhaler.  f/u w/pt w/results.  recheck lab 1 mo. f/u for review.    2. Expiratory wheezing  albuterol 108 " (90 Base) MCG/ACT Aero Soln inhalation aerosol    DX-CHEST-2 VIEWS    fluticasone-salmeterol (ADVAIR) 250-50 MCG/ACT AEROSOL POWDER, BREATH ACTIVATED    PULMONARY FUNCTION TESTS -Test requested: Complete Pulmonary Function Test; Include MIPS/MEPS? Yes    upper airway exp wheezing s/p COVID. use alb inhaler prn.  do cxr, pft's.  add wixela.     3. Proteinuria, unspecified type  MICROALBUMIN CREAT RATIO URINE    will monitor and fam with next lab.  new finding.  was using nsaids.  no DM or HTN uncontrolled.    4. Hyperlipidemia LDL goal <100  Lipid Profile    Comp Metabolic Panel    trg and LDL up from previous and not at goal.  enc pt to improve healthy diet and do regualr exercise.  plan:  recheck lab 4 mo.  f/u for review.    5. Elevated LFTs  Comp Metabolic Panel    he had been drinking more ETOH weekend before doing lab.  recheck w/nexk lab.  f/u for review.    6. Vitamin D deficiency      start otc vitamin d supplement 2000 units daily.           Followup: Return in about 4 weeks (around 8/15/2022).

## 2022-08-16 DIAGNOSIS — M1A.9XX0 CHRONIC GOUT WITHOUT TOPHUS, UNSPECIFIED CAUSE, UNSPECIFIED SITE: ICD-10-CM

## 2022-08-17 RX ORDER — COLCHICINE 0.6 MG/1
TABLET ORAL
Qty: 5 TABLET | Refills: 0 | Status: SHIPPED | OUTPATIENT
Start: 2022-08-17 | End: 2022-09-02

## 2022-09-02 DIAGNOSIS — M1A.9XX0 CHRONIC GOUT WITHOUT TOPHUS, UNSPECIFIED CAUSE, UNSPECIFIED SITE: ICD-10-CM

## 2022-09-02 RX ORDER — COLCHICINE 0.6 MG/1
TABLET ORAL
Qty: 5 TABLET | Refills: 0 | Status: SHIPPED | OUTPATIENT
Start: 2022-09-02 | End: 2022-09-15

## 2022-09-15 DIAGNOSIS — M1A.9XX0 CHRONIC GOUT WITHOUT TOPHUS, UNSPECIFIED CAUSE, UNSPECIFIED SITE: ICD-10-CM

## 2022-09-15 RX ORDER — COLCHICINE 0.6 MG/1
TABLET ORAL
Qty: 5 TABLET | Refills: 0 | Status: SHIPPED | OUTPATIENT
Start: 2022-09-15 | End: 2022-10-18

## 2022-10-18 ENCOUNTER — OFFICE VISIT (OUTPATIENT)
Dept: MEDICAL GROUP | Facility: MEDICAL CENTER | Age: 42
End: 2022-10-18
Payer: COMMERCIAL

## 2022-10-18 VITALS
HEIGHT: 66 IN | WEIGHT: 190 LBS | RESPIRATION RATE: 16 BRPM | SYSTOLIC BLOOD PRESSURE: 142 MMHG | BODY MASS INDEX: 30.53 KG/M2 | HEART RATE: 82 BPM | OXYGEN SATURATION: 98 % | DIASTOLIC BLOOD PRESSURE: 86 MMHG | TEMPERATURE: 98.8 F

## 2022-10-18 DIAGNOSIS — S89.92XA LEFT KNEE INJURY, INITIAL ENCOUNTER: ICD-10-CM

## 2022-10-18 DIAGNOSIS — M1A.9XX0 CHRONIC GOUT WITHOUT TOPHUS, UNSPECIFIED CAUSE, UNSPECIFIED SITE: ICD-10-CM

## 2022-10-18 PROCEDURE — 99214 OFFICE O/P EST MOD 30 MIN: CPT | Performed by: NURSE PRACTITIONER

## 2022-10-18 RX ORDER — COLCHICINE 0.6 MG/1
TABLET ORAL
Qty: 30 TABLET | Refills: 1 | Status: SHIPPED | OUTPATIENT
Start: 2022-10-18 | End: 2023-02-25

## 2022-10-18 RX ORDER — MELOXICAM 15 MG/1
15 TABLET ORAL DAILY
Qty: 14 TABLET | Refills: 0 | Status: SHIPPED | OUTPATIENT
Start: 2022-10-18

## 2022-10-18 ASSESSMENT — FIBROSIS 4 INDEX: FIB4 SCORE: 0.89

## 2022-10-18 NOTE — LETTER
October 18, 2022        Patient: Timothy William Collette   YOB: 1980   Date of Visit: 10/18/2022           To Whom It May Concern:    It is my medical opinion that Timothy Collette is not able to work over 8 hours daily for the next 30 days due to a medical condition.    If you have any questions or concerns, please don't hesitate to call.        Sincerely,          STEPHIE Dickinson.  Electronically Signed    [place] [address] [address] [id number] [phone] (Phone)  [phone] (Fax)

## 2022-10-18 NOTE — PROGRESS NOTES
Subjective:     Timothy William Collette is a 42 y.o. male who presents with left knee injury.    HPI:   Seen in f/u for left knee injury.  About 1 wk ago he overextend knee.  He slipped on bathmat when getting out of the shoulder.   He is using crutches today.  Knee is swollen.  It is better slowly with rest over the weeken.  He cannot work overtime until the knee injury is improved.  He was walking on it until friday when pain got worse d/t having to work long hours.  Now uusing crutches.  Rested the knee over the weekend.  It is swollen on both side of the knee but worst pain if over the knee cap.    He is having more episodes of gout. It is now in his left distal foot.   Distal left foot is swollen and mildly erythematous.  Pain with ROM of toes. Also can have in either foot, ankles or knees.  He is not getting enough colchicine to get completely rid of gout. It will get almost gone but then he will run out of colchicine. Needs refills.  Never started the allopurinol d/t reoccurrent gout.  Last uric acid was 11.      Patient Active Problem List    Diagnosis Date Noted    Obesity (BMI 30-39.9) 01/17/2017    Kidney stone     Chronic gout without tophus        Current medicines (including changes today)  Current Outpatient Medications   Medication Sig Dispense Refill    colchicine (COLCRYS) 0.6 MG Tab TAKE 2 TABLETS BY KERRY ON DAY 1, THEN 1 TABLET 1 HOUR LATER. THEN TAKE 1 TABLET TWICE DAILY ON DAY 2 AND CONTINUE UNTIL PAIN RESOLVES 30 Tablet 1    meloxicam (MOBIC) 15 MG tablet Take 1 Tablet by mouth every day. 14 Tablet 0    albuterol 108 (90 Base) MCG/ACT Aero Soln inhalation aerosol Inhale 2 Puffs every four hours as needed for Shortness of Breath. 1 Each 0    fluticasone-salmeterol (ADVAIR) 250-50 MCG/ACT AEROSOL POWDER, BREATH ACTIVATED Inhale 1 Puff every 12 hours. 180 Each 0    allopurinol (ZYLOPRIM) 100 MG Tab Take 1 Tablet by mouth every day. (Patient not taking: Reported on 10/18/2022) 30 Tablet 3  "    No current facility-administered medications for this visit.       No Known Allergies    ROS  Constitutional: Negative. Negative for fever, chills, weight loss, malaise/fatigue and diaphoresis.   HENT: Negative. Negative for hearing loss, ear pain, nosebleeds, congestion, sore throat, neck pain, tinnitus and ear discharge.   Respiratory: Negative. Negative for cough, hemoptysis, sputum production, shortness of breath, wheezing and stridor.   Cardiovascular: Negative. Negative for chest pain, palpitations, orthopnea, claudication, leg swelling and PND.   Gastrointestinal: Denies nausea, vomiting, diarrhea, constipation, heartburn, melena or hematochezia.  Genitourinary: Denies dysuria, hematuria, urinary incontinence, frequency or urgency.        Objective:     BP (!) 142/86   Pulse 82   Temp 37.1 °C (98.8 °F) (Temporal)   Resp 16   Ht 1.676 m (5' 6\")   Wt 86.2 kg (190 lb)   SpO2 98%  Body mass index is 30.67 kg/m².    Physical Exam:  Vitals reviewed.  Constitutional: Oriented to person, place, and time. appears well-developed and well-nourished. No distress.   Cardiovascular: Normal rate, regular rhythm, normal heart sounds and intact distal pulses. Exam reveals no gallop and no friction rub. No murmur heard. No carotid bruits.   Pulmonary/Chest: Effort normal and breath sounds normal. No stridor. No respiratory distress. no wheezes or rales. exhibits no tenderness.   Musculoskeletal: amb on crutches. exhibits left knee edema. kanwal pedal pulses 2+.  Pain with ROM left knee.  Negative drawer, eduardo and lachman test.   Lymphadenopathy: No cervical or supraclavicular adenopathy.   Neurological: Alert and oriented to person, place, and time. exhibits normal muscle tone.  Skin: Skin is warm and dry. No diaphoresis.   Psychiatric: Normal mood and affect. Behavior is normal.      Assessment and Plan:     The following treatment plan was discussed:    1. Chronic gout without tophus, unspecified cause, " unspecified site  colchicine (COLCRYS) 0.6 MG Tab    start allopurinol along w/colchicine.  RF colchicine. check lab late december. f/u for review.       2. Left knee injury, initial encounter  meloxicam (MOBIC) 15 MG tablet    RICE.  letter to not work OT.  continue to rest knee.  if does not continue to improve email for xray.  use mobic 15 mg x 14 days.             Followup: Return in about 2 months (around 12/18/2022).

## 2023-02-14 NOTE — TELEPHONE ENCOUNTER
Left a message for patient to call back at [phone].     What Type Of Note Output Would You Prefer (Optional)?: Standard Output Hpi Title: Evaluation of Skin Lesions Have Your Spot(S) Been Treated In The Past?: has not been treated

## 2023-02-27 DIAGNOSIS — M1A.9XX0 CHRONIC GOUT WITHOUT TOPHUS, UNSPECIFIED CAUSE, UNSPECIFIED SITE: ICD-10-CM

## 2023-02-27 DIAGNOSIS — M10.9 ACUTE GOUT, UNSPECIFIED CAUSE, UNSPECIFIED SITE: ICD-10-CM

## 2023-02-27 RX ORDER — ALLOPURINOL 300 MG/1
300 TABLET ORAL DAILY
Qty: 30 TABLET | Refills: 3 | Status: SHIPPED | OUTPATIENT
Start: 2023-02-27

## 2023-02-27 RX ORDER — COLCHICINE 0.6 MG/1
TABLET ORAL
Qty: 30 TABLET | Refills: 0 | Status: SHIPPED | OUTPATIENT
Start: 2023-02-27 | End: 2023-04-22

## 2023-03-22 ENCOUNTER — OFFICE VISIT (OUTPATIENT)
Dept: MEDICAL GROUP | Facility: MEDICAL CENTER | Age: 43
End: 2023-03-22
Payer: COMMERCIAL

## 2023-03-22 ENCOUNTER — HOSPITAL ENCOUNTER (OUTPATIENT)
Dept: RADIOLOGY | Facility: MEDICAL CENTER | Age: 43
End: 2023-03-22
Attending: NURSE PRACTITIONER
Payer: COMMERCIAL

## 2023-03-22 VITALS
BODY MASS INDEX: 30.47 KG/M2 | SYSTOLIC BLOOD PRESSURE: 140 MMHG | DIASTOLIC BLOOD PRESSURE: 80 MMHG | OXYGEN SATURATION: 96 % | RESPIRATION RATE: 14 BRPM | HEIGHT: 66 IN | TEMPERATURE: 97.9 F | HEART RATE: 75 BPM | WEIGHT: 189.6 LBS

## 2023-03-22 DIAGNOSIS — S69.92XA LEFT WRIST INJURY, INITIAL ENCOUNTER: ICD-10-CM

## 2023-03-22 DIAGNOSIS — R06.2 EXPIRATORY WHEEZING: ICD-10-CM

## 2023-03-22 DIAGNOSIS — Z12.11 SCREEN FOR COLON CANCER: ICD-10-CM

## 2023-03-22 PROCEDURE — 73110 X-RAY EXAM OF WRIST: CPT | Mod: LT

## 2023-03-22 PROCEDURE — 99214 OFFICE O/P EST MOD 30 MIN: CPT | Performed by: NURSE PRACTITIONER

## 2023-03-22 RX ORDER — DICLOFENAC SODIUM 75 MG/1
75 TABLET, DELAYED RELEASE ORAL 2 TIMES DAILY
Qty: 30 TABLET | Refills: 0 | Status: SHIPPED | OUTPATIENT
Start: 2023-03-22

## 2023-03-22 RX ORDER — ALBUTEROL SULFATE 90 UG/1
2 AEROSOL, METERED RESPIRATORY (INHALATION) EVERY 4 HOURS PRN
Qty: 1 EACH | Refills: 0 | Status: SHIPPED | OUTPATIENT
Start: 2023-03-22

## 2023-03-22 RX ORDER — FLUTICASONE PROPIONATE AND SALMETEROL 250; 50 UG/1; UG/1
1 POWDER RESPIRATORY (INHALATION) EVERY 12 HOURS
Qty: 180 EACH | Refills: 0 | Status: SHIPPED | OUTPATIENT
Start: 2023-03-22 | End: 2024-03-16 | Stop reason: SDUPTHER

## 2023-03-22 ASSESSMENT — FIBROSIS 4 INDEX: FIB4 SCORE: 0.91

## 2023-03-22 ASSESSMENT — PATIENT HEALTH QUESTIONNAIRE - PHQ9: CLINICAL INTERPRETATION OF PHQ2 SCORE: 0

## 2023-03-22 NOTE — PROGRESS NOTES
Subjective:     Timothy William Collette is a 43 y.o. male who presents with left wrist injury.    HPI:   Seen in f/u for left wrist injury. Occurred 2 wk ago when working on car.  Door slammed on the wrist.  Still swelling with worse on outside left wrist.  Pain with any ROM.  No tx so far.  No numbness.  Using ibuprofen regularly for the pain.  He tried to tx poss gout with cohchicine but that didn't work.  Mild swelling.  He has been wearing a brace.  He has a hx of gout.  No flares since increasing allopurinol.  Taking med approp.    He has asthma.  Uses rescue inhaler only rarely.  Still using advair that helps his sx.  He has wheezing on exam today.  He reports that is common.    Patient Active Problem List    Diagnosis Date Noted    Obesity (BMI 30-39.9) 01/17/2017    Kidney stone     Chronic gout without tophus        Current medicines (including changes today)  Current Outpatient Medications   Medication Sig Dispense Refill    albuterol 108 (90 Base) MCG/ACT Aero Soln inhalation aerosol Inhale 2 Puffs every four hours as needed for Shortness of Breath. 1 Each 0    fluticasone-salmeterol (ADVAIR) 250-50 MCG/ACT AEROSOL POWDER, BREATH ACTIVATED Inhale 1 Puff every 12 hours. 180 Each 0    diclofenac DR (VOLTAREN) 75 MG Tablet Delayed Response Take 1 Tablet by mouth 2 times a day. 30 Tablet 0    colchicine (COLCRYS) 0.6 MG Tab TAKE 2 TABLETS BY MOUTH ON DAY 1, THEN 1 TABLET 1 HOUR LATER, THEN 1 TABLET TWICE DAILY ON DAY 2 AND CONTINUE UNTIL PAIN RESOLVES 30 Tablet 0    allopurinol (ZYLOPRIM) 300 MG Tab Take 1 Tablet by mouth every day. 30 Tablet 3    meloxicam (MOBIC) 15 MG tablet Take 1 Tablet by mouth every day. 14 Tablet 0     No current facility-administered medications for this visit.       No Known Allergies    ROS  Constitutional: Negative. Negative for fever, chills, weight loss, malaise/fatigue and diaphoresis.   HENT: Negative. Negative for hearing loss, ear pain, nosebleeds, congestion, sore  "throat, neck pain, tinnitus and ear discharge.   Respiratory: Negative. Negative for cough, hemoptysis, stridor.   Cardiovascular: Negative. Negative for chest pain, palpitations, orthopnea, claudication, leg swelling and PND.   Gastrointestinal: Denies nausea, vomiting, diarrhea, constipation, heartburn, melena or hematochezia.  Genitourinary: Denies dysuria, hematuria, urinary incontinence, frequency or urgency.        Objective:     BP (!) 140/80 (BP Location: Right arm, Patient Position: Sitting, BP Cuff Size: Adult)   Pulse 75   Temp 36.6 °C (97.9 °F) (Temporal)   Resp 14   Ht 1.676 m (5' 6\")   Wt 86 kg (189 lb 9.6 oz)   SpO2 96%  Body mass index is 30.6 kg/m².    Physical Exam:  Vitals reviewed.  Constitutional: Oriented to person, place, and time. appears well-developed and well-nourished. No distress.   Cardiovascular: Normal rate, regular rhythm, normal heart sounds and intact distal pulses. Exam reveals no gallop and no friction rub. No murmur heard. No carotid bruits.   Pulmonary/Chest: Effort normal . No stridor. No respiratory distress. Exp wheezes noted kanwal Upper lobes.  no rales. exhibits no tenderness.   Musculoskeletal: pain in left wrist with range of motion. exhibits mild lateral left wrist edema. kanwal pedal and redial pulses 2+.  Lymphadenopathy: No cervical or supraclavicular adenopathy.   Neurological: Alert and oriented to person, place, and time. exhibits normal muscle tone.  Skin: Skin is warm and dry. No diaphoresis.   Psychiatric: Normal mood and affect. Behavior is normal.   He is due colonoscopy     Assessment and Plan:     The following treatment plan was discussed:    1. Expiratory wheezing  albuterol 108 (90 Base) MCG/ACT Aero Soln inhalation aerosol    fluticasone-salmeterol (ADVAIR) 250-50 MCG/ACT AEROSOL POWDER, BREATH ACTIVATED    upper airway exp wheezing s/p COVID. use alb inhaler prn.  do cxr, pft's.  add wixela.        2. Left wrist injury, initial encounter  " DX-WRIST-COMPLETE 3+ LEFT    diclofenac DR (VOLTAREN) 75 MG Tablet Delayed Response    use voltaren x 14 days.  xray left wrist. f/u w/pt w/results.  then f/u 3mo for lab review or sooner if pain not improving.      3. Screen for colon cancer  Referral to Gastroenterology    refer GI for colonoscopy            Followup: Return in about 3 months (around 6/22/2023), or for lab review.

## 2023-04-22 ENCOUNTER — OFFICE VISIT (OUTPATIENT)
Dept: URGENT CARE | Facility: CLINIC | Age: 43
End: 2023-04-22
Payer: COMMERCIAL

## 2023-04-22 VITALS
WEIGHT: 195 LBS | HEIGHT: 67 IN | DIASTOLIC BLOOD PRESSURE: 82 MMHG | HEART RATE: 85 BPM | RESPIRATION RATE: 18 BRPM | OXYGEN SATURATION: 97 % | BODY MASS INDEX: 30.61 KG/M2 | SYSTOLIC BLOOD PRESSURE: 128 MMHG | TEMPERATURE: 98.6 F

## 2023-04-22 DIAGNOSIS — M10.9 ACUTE GOUT, UNSPECIFIED CAUSE, UNSPECIFIED SITE: ICD-10-CM

## 2023-04-22 DIAGNOSIS — M10.9 ACUTE GOUT OF RIGHT KNEE, UNSPECIFIED CAUSE: ICD-10-CM

## 2023-04-22 DIAGNOSIS — M1A.9XX0 CHRONIC GOUT WITHOUT TOPHUS, UNSPECIFIED CAUSE, UNSPECIFIED SITE: ICD-10-CM

## 2023-04-22 PROCEDURE — 99213 OFFICE O/P EST LOW 20 MIN: CPT

## 2023-04-22 RX ORDER — PREDNISONE 20 MG/1
40 TABLET ORAL DAILY
Qty: 10 TABLET | Refills: 0 | Status: SHIPPED | OUTPATIENT
Start: 2023-04-22 | End: 2023-04-27

## 2023-04-22 RX ORDER — COLCHICINE 0.6 MG/1
TABLET ORAL
Qty: 30 TABLET | Refills: 0 | Status: SHIPPED | OUTPATIENT
Start: 2023-04-22 | End: 2023-08-03 | Stop reason: SDUPTHER

## 2023-04-22 RX ORDER — KETOROLAC TROMETHAMINE 30 MG/ML
15 INJECTION, SOLUTION INTRAMUSCULAR; INTRAVENOUS ONCE
Status: COMPLETED | OUTPATIENT
Start: 2023-04-22 | End: 2023-04-22

## 2023-04-22 RX ORDER — ACETAMINOPHEN 500 MG
500 TABLET ORAL ONCE
Status: COMPLETED | OUTPATIENT
Start: 2023-04-22 | End: 2023-04-22

## 2023-04-22 RX ADMIN — KETOROLAC TROMETHAMINE 15 MG: 30 INJECTION, SOLUTION INTRAMUSCULAR; INTRAVENOUS at 10:31

## 2023-04-22 RX ADMIN — Medication 500 MG: at 10:30

## 2023-04-22 ASSESSMENT — FIBROSIS 4 INDEX: FIB4 SCORE: 0.91

## 2023-04-22 NOTE — PROGRESS NOTES
Chief Complaint   Patient presents with    Gout     X3 weeks, gout flare up on the right knee       HISTORY OF PRESENT ILLNESS: Patient is a pleasant 43 y.o. male who presents to urgent care today gout flare that started about three weeks ago, patient states he took medication which was prescribed but it continues to get worse despite this.  Patient states he rarely drinks, no eating of red meats.      Patient Active Problem List    Diagnosis Date Noted    Obesity (BMI 30-39.9) 01/17/2017    Kidney stone     Chronic gout without tophus        Allergies:Patient has no known allergies.    Current Outpatient Medications Ordered in Epic   Medication Sig Dispense Refill    predniSONE (DELTASONE) 20 MG Tab Take 2 Tablets by mouth every day for 5 days. 10 Tablet 0    albuterol 108 (90 Base) MCG/ACT Aero Soln inhalation aerosol Inhale 2 Puffs every four hours as needed for Shortness of Breath. 1 Each 0    fluticasone-salmeterol (ADVAIR) 250-50 MCG/ACT AEROSOL POWDER, BREATH ACTIVATED Inhale 1 Puff every 12 hours. 180 Each 0    diclofenac DR (VOLTAREN) 75 MG Tablet Delayed Response Take 1 Tablet by mouth 2 times a day. 30 Tablet 0    colchicine (COLCRYS) 0.6 MG Tab TAKE 2 TABLETS BY MOUTH ON DAY 1, THEN 1 TABLET 1 HOUR LATER, THEN 1 TABLET TWICE DAILY ON DAY 2 AND CONTINUE UNTIL PAIN RESOLVES 30 Tablet 0    allopurinol (ZYLOPRIM) 300 MG Tab Take 1 Tablet by mouth every day. 30 Tablet 3    meloxicam (MOBIC) 15 MG tablet Take 1 Tablet by mouth every day. 14 Tablet 0     Current Facility-Administered Medications Ordered in Epic   Medication Dose Route Frequency Provider Last Rate Last Admin    ketorolac (TORADOL) injection 15 mg  15 mg Intramuscular Once KELI MontesP.R.N.        acetaminophen (TYLENOL) tablet 500 mg  500 mg Oral Once MARQUEZ Montes.P.R.N.           Past Medical History:   Diagnosis Date    Chronic gout without tophus     Kidney stone     Obesity (BMI 30-39.9) 1/17/2017       Social History     Tobacco  "Use    Smoking status: Former     Packs/day: 0.50     Years: 1.00     Pack years: 0.50     Types: Cigarettes    Smokeless tobacco: Never   Vaping Use    Vaping Use: Never used   Substance Use Topics    Alcohol use: Not Currently     Alcohol/week: 0.0 oz    Drug use: No       Family Status   Relation Name Status    Mo  Alive    Fa  Alive    Sis x1 Alive    Bro x1 Alive    MGMo      MGFa old age     PGMo      PGFa adopted      Family History   Problem Relation Age of Onset    Diabetes Mother     Cancer Maternal Grandmother         melanoma       ROS:  Review of Systems   Constitutional: Negative for fever, chills, weight loss, malaise, and fatigue.   HENT: Negative for ear pain, nosebleeds, congestion, sore throat and neck pain.    Eyes: Negative for vision changes.   Neuro: Negative for headache, sensory changes, weakness, seizure, LOC.   Cardiovascular: Negative for chest pain, palpitations, orthopnea and leg swelling.   Respiratory: Negative for cough, sputum production, shortness of breath and wheezing.   Gastrointestinal: Negative for abdominal pain, nausea, vomiting or diarrhea.   Genitourinary: Negative for dysuria, urgency and frequency.  Musculoskeletal: Negative for falls, neck pain, back pain, right-sided knee pain for the last 3 weeks.  No major swelling or redness.  Skin: Negative for rash, diaphoresis.     Exam:  /82   Pulse 85   Temp 37 °C (98.6 °F) (Temporal)   Resp 18   Ht 1.702 m (5' 7\")   Wt 88.5 kg (195 lb)   SpO2 97%   General: well-nourished, well-developed male in NAD  Head: normocephalic, atraumatic  Eyes: PERRLA, no conjunctival injection, acuity grossly intact, lids normal.  Ears: normal shape and symmetry, no tenderness, no discharge. External canals are without any significant edema or erythema. Tympanic membranes are without any inflammation, no effusion. Gross auditory acuity is intact.  Nose: symmetrical without tenderness, no " discharge.  Mouth/Throat: reasonable hygiene, no erythema, exudates or tonsillar enlargement.  Neck: no masses, range of motion within normal limits, no tracheal deviation. No obvious thyroid enlargement.   Lymph: no cervical adenopathy. No supraclavicular adenopathy.   Neuro: alert and oriented. Cranial nerves 1-12 grossly intact. No sensory deficit.   Cardiovascular: regular rate and rhythm. No edema.  Pulmonary: no distress. Chest is symmetrical with respiration, no wheezes, crackles, or rhonchi.   Abdomen: soft, non-tender, no guarding, no hepatosplenomegaly.  Musculoskeletal: no clubbing, appropriate muscle tone, gait is stable.  No major swelling or redness noted to the right knee.  Patient has pain with ambulation  Skin: warm, dry, intact, no clubbing, no cyanosis, no rashes.   Psych: appropriate mood, affect, judgement.         Assessment/Plan:  1. Acute gout of right knee, unspecified cause  predniSONE (DELTASONE) 20 MG Tab    ketorolac (TORADOL) injection 15 mg    acetaminophen (TYLENOL) tablet 500 mg        This is a 43-year-old male with a history of ongoing gout.  He is taking colchicine for his symptoms in the past.  He states for the last 3 weeks he has had ongoing gout issues.  He attempted to call his primary care provider but has been unable to return.  He took what was left of his colchicine but noted little to no relief.  On physical exam he has noted pain with ambulation to his right knee, no major redness or swelling.  The area is not warm to touch.  Due to his ongoing symptoms I did go ahead and prescribe him a course of prednisone, Toradol, Tylenol for comfort measures here in the office today.  Patient was advised of the signs and symptoms of infection, as I cannot completely rule this out today here in the office.  Patient verbalized understanding, advised to please follow-up if he continues to get worse or does not improve.    Supportive care, differential diagnoses, and indications for  immediate follow-up discussed with patient.   Pathogenesis of diagnosis discussed including typical length and natural progression.   Instructed to return to clinic or nearest emergency department for any change in condition, further concerns, or worsening of symptoms.  Patient states understanding of the plan of care and discharge instructions.  Instructed to make an appointment, for follow up, with  primary care provider.        Please note that this dictation was created using voice recognition software. I have made every reasonable attempt to correct obvious errors, but I expect that there are errors of grammar and possibly content that I did not discover before finalizing the note.      Kamille MONDRAGON

## 2023-08-03 DIAGNOSIS — E78.5 HYPERLIPIDEMIA LDL GOAL <100: ICD-10-CM

## 2023-08-03 DIAGNOSIS — Z13.29 SCREENING FOR THYROID DISORDER: ICD-10-CM

## 2023-08-03 DIAGNOSIS — Z13.220 SCREENING FOR HYPERLIPIDEMIA: ICD-10-CM

## 2023-08-03 DIAGNOSIS — M1A.9XX0 CHRONIC GOUT WITHOUT TOPHUS, UNSPECIFIED CAUSE, UNSPECIFIED SITE: ICD-10-CM

## 2023-08-03 DIAGNOSIS — M10.9 ACUTE GOUT, UNSPECIFIED CAUSE, UNSPECIFIED SITE: ICD-10-CM

## 2023-08-03 DIAGNOSIS — R80.9 PROTEINURIA, UNSPECIFIED TYPE: ICD-10-CM

## 2023-08-03 DIAGNOSIS — R03.0 ELEVATED BLOOD PRESSURE READING: ICD-10-CM

## 2023-08-03 DIAGNOSIS — E55.9 VITAMIN D DEFICIENCY: ICD-10-CM

## 2023-08-03 DIAGNOSIS — R79.89 ELEVATED LFTS: ICD-10-CM

## 2023-08-03 RX ORDER — COLCHICINE 0.6 MG/1
TABLET ORAL
Qty: 30 TABLET | Refills: 0 | Status: SHIPPED | OUTPATIENT
Start: 2023-08-03 | End: 2023-12-02

## 2023-11-29 DIAGNOSIS — M10.9 ACUTE GOUT, UNSPECIFIED CAUSE, UNSPECIFIED SITE: ICD-10-CM

## 2023-11-29 DIAGNOSIS — M1A.9XX0 CHRONIC GOUT WITHOUT TOPHUS, UNSPECIFIED CAUSE, UNSPECIFIED SITE: ICD-10-CM

## 2023-11-30 NOTE — TELEPHONE ENCOUNTER
Received request via: Pharmacy    Was the patient seen in the last year in this department? Yes    Does the patient have an active prescription (recently filled or refills available) for medication(s) requested? yes    Does the patient have [facility] and need 100 day supply (blood pressure, diabetes and cholesterol meds only)? Patient does not have SCP   Requested Prescriptions     Pending Prescriptions Disp Refills    colchicine (COLCRYS) 0.6 MG Tab [Pharmacy Med Name: Colchicine 0.6 MG Oral Tablet] 30 Tablet 0     Sig: TAKE 2 TABLETS BY MOUTH ON DAY 1, THEN 1 TABLET 1 HOUR LATER, THEN 1 TABLET TWICE DAILY ON DAY 2, AND CONTINUE UNTIL PAIN RESOLVES.

## 2023-12-02 RX ORDER — COLCHICINE 0.6 MG/1
TABLET ORAL
Qty: 30 TABLET | Refills: 0 | Status: SHIPPED | OUTPATIENT
Start: 2023-12-02 | End: 2024-03-17

## 2024-03-14 DIAGNOSIS — M10.9 ACUTE GOUT, UNSPECIFIED CAUSE, UNSPECIFIED SITE: ICD-10-CM

## 2024-03-14 DIAGNOSIS — M1A.9XX0 CHRONIC GOUT WITHOUT TOPHUS, UNSPECIFIED CAUSE, UNSPECIFIED SITE: ICD-10-CM

## 2024-03-14 NOTE — TELEPHONE ENCOUNTER
Colchicine 0.6 MG Oral Tablet     Received request via: Pharmacy    Was the patient seen in the last year in this department? Yes    Does the patient have an active prescription (recently filled or refills available) for medication(s) requested? No    Pharmacy Name:  [id number] - [place], [place] - [address]     Does the patient have [facility] and need 100 day supply (blood pressure, diabetes and cholesterol meds only)? Patient does not have SCP   [>50% of Time Spent on Counseling for ____] : Greater than 50% of the encounter time was spent on counseling for [unfilled] [Time Spent: ___ minutes] : I have spent [unfilled] minutes of face to face time with the patient

## 2024-03-16 DIAGNOSIS — M1A.9XX0 CHRONIC GOUT WITHOUT TOPHUS, UNSPECIFIED CAUSE, UNSPECIFIED SITE: ICD-10-CM

## 2024-03-16 DIAGNOSIS — M10.9 ACUTE GOUT, UNSPECIFIED CAUSE, UNSPECIFIED SITE: ICD-10-CM

## 2024-03-16 DIAGNOSIS — R06.2 EXPIRATORY WHEEZING: ICD-10-CM

## 2024-03-16 RX ORDER — FLUTICASONE PROPIONATE AND SALMETEROL 250; 50 UG/1; UG/1
1 POWDER RESPIRATORY (INHALATION) EVERY 12 HOURS
Qty: 30 EACH | Refills: 0 | Status: SHIPPED | OUTPATIENT
Start: 2024-03-16

## 2024-03-16 RX ORDER — ALBUTEROL SULFATE 90 UG/1
2 AEROSOL, METERED RESPIRATORY (INHALATION) EVERY 4 HOURS PRN
Qty: 1 EACH | Refills: 0 | OUTPATIENT
Start: 2024-03-16

## 2024-03-16 RX ORDER — COLCHICINE 0.6 MG/1
TABLET ORAL
Qty: 30 TABLET | Refills: 0 | OUTPATIENT
Start: 2024-03-16

## 2024-03-17 RX ORDER — COLCHICINE 0.6 MG/1
TABLET ORAL
Qty: 30 TABLET | Refills: 0 | Status: SHIPPED | OUTPATIENT
Start: 2024-03-17

## 2024-06-05 DIAGNOSIS — M1A.9XX0 CHRONIC GOUT WITHOUT TOPHUS, UNSPECIFIED CAUSE, UNSPECIFIED SITE: ICD-10-CM

## 2024-06-05 DIAGNOSIS — R06.2 EXPIRATORY WHEEZING: ICD-10-CM

## 2024-06-05 DIAGNOSIS — M10.9 ACUTE GOUT, UNSPECIFIED CAUSE, UNSPECIFIED SITE: ICD-10-CM

## 2024-06-07 NOTE — TELEPHONE ENCOUNTER
Received request via: Pharmacy    Was the patient seen in the last year in this department? No  Last office visit 03/23/2023     Does the patient have an active prescription (recently filled or refills available) for medication(s) requested?  Yes     Pharmacy: [id number]  [place], [place] - [address]

## 2024-06-09 RX ORDER — COLCHICINE 0.6 MG/1
TABLET ORAL
Qty: 30 TABLET | Refills: 0 | Status: SHIPPED | OUTPATIENT
Start: 2024-06-09 | End: 2024-06-22 | Stop reason: SDUPTHER

## 2024-06-09 RX ORDER — FLUTICASONE PROPIONATE AND SALMETEROL 250; 50 UG/1; UG/1
1 POWDER RESPIRATORY (INHALATION) EVERY 12 HOURS
Qty: 30 EACH | Refills: 0 | Status: SHIPPED | OUTPATIENT
Start: 2024-06-09

## 2024-06-22 DIAGNOSIS — M10.9 ACUTE GOUT, UNSPECIFIED CAUSE, UNSPECIFIED SITE: ICD-10-CM

## 2024-06-22 DIAGNOSIS — M1A.9XX0 CHRONIC GOUT WITHOUT TOPHUS, UNSPECIFIED CAUSE, UNSPECIFIED SITE: ICD-10-CM

## 2024-06-22 RX ORDER — COLCHICINE 0.6 MG/1
TABLET ORAL
Qty: 30 TABLET | Refills: 0 | Status: SHIPPED | OUTPATIENT
Start: 2024-06-22